# Patient Record
Sex: MALE | Race: WHITE | NOT HISPANIC OR LATINO | Employment: OTHER | URBAN - METROPOLITAN AREA
[De-identification: names, ages, dates, MRNs, and addresses within clinical notes are randomized per-mention and may not be internally consistent; named-entity substitution may affect disease eponyms.]

---

## 2017-12-19 ENCOUNTER — HOSPITAL ENCOUNTER (INPATIENT)
Facility: HOSPITAL | Age: 82
LOS: 6 days | Discharge: HOME | DRG: 175 | End: 2017-12-25
Attending: EMERGENCY MEDICINE | Admitting: INTERNAL MEDICINE
Payer: MEDICARE

## 2017-12-19 DIAGNOSIS — K56.609 SMALL BOWEL OBSTRUCTION: ICD-10-CM

## 2017-12-19 DIAGNOSIS — I26.99 PULMONARY EMBOLISM: ICD-10-CM

## 2017-12-19 DIAGNOSIS — R06.02 SHORTNESS OF BREATH: ICD-10-CM

## 2017-12-19 DIAGNOSIS — K56.7 ILEUS: ICD-10-CM

## 2017-12-19 DIAGNOSIS — I26.02 ACUTE SADDLE PULMONARY EMBOLISM WITH ACUTE COR PULMONALE: Primary | ICD-10-CM

## 2017-12-19 PROBLEM — I10 HYPERTENSION: Status: ACTIVE | Noted: 2017-12-19

## 2017-12-19 PROBLEM — N40.0 BPH (BENIGN PROSTATIC HYPERPLASIA): Status: ACTIVE | Noted: 2017-12-19

## 2017-12-19 PROBLEM — E78.5 HYPERLIPIDEMIA: Status: ACTIVE | Noted: 2017-12-19

## 2017-12-19 LAB
ABO + RH BLD: NORMAL
ALBUMIN SERPL BCP-MCNC: 3.3 G/DL
ALBUMIN SERPL BCP-MCNC: 3.4 G/DL
ALP SERPL-CCNC: 73 U/L
ALP SERPL-CCNC: 74 U/L
ALT SERPL W/O P-5'-P-CCNC: 17 U/L
ALT SERPL W/O P-5'-P-CCNC: 19 U/L
ANION GAP SERPL CALC-SCNC: 10 MMOL/L
ANION GAP SERPL CALC-SCNC: 8 MMOL/L
APTT BLDCRRT: 29.5 SEC
APTT BLDCRRT: 33.2 SEC
AST SERPL-CCNC: 18 U/L
AST SERPL-CCNC: 22 U/L
BASOPHILS # BLD AUTO: 0.06 K/UL
BASOPHILS # BLD AUTO: 0.06 K/UL
BASOPHILS # BLD AUTO: 0.07 K/UL
BASOPHILS NFR BLD: 0.8 %
BASOPHILS NFR BLD: 0.8 %
BASOPHILS NFR BLD: 1.1 %
BILIRUB SERPL-MCNC: 0.9 MG/DL
BILIRUB SERPL-MCNC: 0.9 MG/DL
BLD GP AB SCN CELLS X3 SERPL QL: NORMAL
BNP SERPL-MCNC: 313 PG/ML
BUN SERPL-MCNC: 14 MG/DL
BUN SERPL-MCNC: 14 MG/DL
CALCIUM SERPL-MCNC: 9 MG/DL
CALCIUM SERPL-MCNC: 9.2 MG/DL
CHLORIDE SERPL-SCNC: 107 MMOL/L
CHLORIDE SERPL-SCNC: 107 MMOL/L
CO2 SERPL-SCNC: 26 MMOL/L
CO2 SERPL-SCNC: 26 MMOL/L
CREAT SERPL-MCNC: 0.9 MG/DL
CREAT SERPL-MCNC: 0.9 MG/DL
DIFFERENTIAL METHOD: ABNORMAL
EOSINOPHIL # BLD AUTO: 0.2 K/UL
EOSINOPHIL # BLD AUTO: 0.2 K/UL
EOSINOPHIL # BLD AUTO: 0.3 K/UL
EOSINOPHIL NFR BLD: 2.8 %
EOSINOPHIL NFR BLD: 2.8 %
EOSINOPHIL NFR BLD: 4.1 %
ERYTHROCYTE [DISTWIDTH] IN BLOOD BY AUTOMATED COUNT: 13.4 %
ERYTHROCYTE [DISTWIDTH] IN BLOOD BY AUTOMATED COUNT: 13.5 %
ERYTHROCYTE [DISTWIDTH] IN BLOOD BY AUTOMATED COUNT: 13.5 %
EST. GFR  (AFRICAN AMERICAN): >60 ML/MIN/1.73 M^2
EST. GFR  (AFRICAN AMERICAN): >60 ML/MIN/1.73 M^2
EST. GFR  (NON AFRICAN AMERICAN): >60 ML/MIN/1.73 M^2
EST. GFR  (NON AFRICAN AMERICAN): >60 ML/MIN/1.73 M^2
FACT X PPP CHRO-ACNC: 0.85 IU/ML
FACT X PPP CHRO-ACNC: 0.91 IU/ML
GLUCOSE SERPL-MCNC: 103 MG/DL
GLUCOSE SERPL-MCNC: 113 MG/DL
HCT VFR BLD AUTO: 36.1 %
HCT VFR BLD AUTO: 36.4 %
HCT VFR BLD AUTO: 36.4 %
HGB BLD-MCNC: 12 G/DL
HGB BLD-MCNC: 12.4 G/DL
HGB BLD-MCNC: 12.4 G/DL
IMM GRANULOCYTES # BLD AUTO: 0.01 K/UL
IMM GRANULOCYTES # BLD AUTO: 0.03 K/UL
IMM GRANULOCYTES # BLD AUTO: 0.03 K/UL
IMM GRANULOCYTES NFR BLD AUTO: 0.2 %
IMM GRANULOCYTES NFR BLD AUTO: 0.4 %
IMM GRANULOCYTES NFR BLD AUTO: 0.4 %
INR PPP: 1.1
LYMPHOCYTES # BLD AUTO: 1.4 K/UL
LYMPHOCYTES # BLD AUTO: 1.4 K/UL
LYMPHOCYTES # BLD AUTO: 1.6 K/UL
LYMPHOCYTES NFR BLD: 17.4 %
LYMPHOCYTES NFR BLD: 17.4 %
LYMPHOCYTES NFR BLD: 23.7 %
MAGNESIUM SERPL-MCNC: 2.3 MG/DL
MCH RBC QN AUTO: 31.7 PG
MCH RBC QN AUTO: 32.4 PG
MCH RBC QN AUTO: 32.4 PG
MCHC RBC AUTO-ENTMCNC: 33.2 G/DL
MCHC RBC AUTO-ENTMCNC: 34.1 G/DL
MCHC RBC AUTO-ENTMCNC: 34.1 G/DL
MCV RBC AUTO: 95 FL
MONOCYTES # BLD AUTO: 0.7 K/UL
MONOCYTES # BLD AUTO: 0.8 K/UL
MONOCYTES # BLD AUTO: 0.8 K/UL
MONOCYTES NFR BLD: 11.2 %
MONOCYTES NFR BLD: 9.9 %
MONOCYTES NFR BLD: 9.9 %
NEUTROPHILS # BLD AUTO: 3.9 K/UL
NEUTROPHILS # BLD AUTO: 5.5 K/UL
NEUTROPHILS # BLD AUTO: 5.5 K/UL
NEUTROPHILS NFR BLD: 59.7 %
NEUTROPHILS NFR BLD: 68.7 %
NEUTROPHILS NFR BLD: 68.7 %
NRBC BLD-RTO: 0 /100 WBC
PHOSPHATE SERPL-MCNC: 2.5 MG/DL
PLATELET # BLD AUTO: 130 K/UL
PLATELET # BLD AUTO: 135 K/UL
PLATELET # BLD AUTO: 135 K/UL
PMV BLD AUTO: 11.3 FL
POTASSIUM SERPL-SCNC: 3.4 MMOL/L
POTASSIUM SERPL-SCNC: 3.6 MMOL/L
PROT SERPL-MCNC: 6.8 G/DL
PROT SERPL-MCNC: 7 G/DL
PROTHROMBIN TIME: 11.4 SEC
RBC # BLD AUTO: 3.79 M/UL
RBC # BLD AUTO: 3.83 M/UL
RBC # BLD AUTO: 3.83 M/UL
SODIUM SERPL-SCNC: 141 MMOL/L
SODIUM SERPL-SCNC: 143 MMOL/L
TROPONIN I SERPL DL<=0.01 NG/ML-MCNC: 0.11 NG/ML
WBC # BLD AUTO: 6.54 K/UL
WBC # BLD AUTO: 7.98 K/UL
WBC # BLD AUTO: 7.98 K/UL

## 2017-12-19 PROCEDURE — 94761 N-INVAS EAR/PLS OXIMETRY MLT: CPT

## 2017-12-19 PROCEDURE — 85730 THROMBOPLASTIN TIME PARTIAL: CPT

## 2017-12-19 PROCEDURE — 93010 ELECTROCARDIOGRAM REPORT: CPT | Mod: ,,, | Performed by: INTERNAL MEDICINE

## 2017-12-19 PROCEDURE — 83735 ASSAY OF MAGNESIUM: CPT

## 2017-12-19 PROCEDURE — 83880 ASSAY OF NATRIURETIC PEPTIDE: CPT

## 2017-12-19 PROCEDURE — 96366 THER/PROPH/DIAG IV INF ADDON: CPT

## 2017-12-19 PROCEDURE — 84100 ASSAY OF PHOSPHORUS: CPT

## 2017-12-19 PROCEDURE — 99291 CRITICAL CARE FIRST HOUR: CPT | Mod: 25

## 2017-12-19 PROCEDURE — 27000221 HC OXYGEN, UP TO 24 HOURS

## 2017-12-19 PROCEDURE — 84484 ASSAY OF TROPONIN QUANT: CPT

## 2017-12-19 PROCEDURE — 63600175 PHARM REV CODE 636 W HCPCS: Performed by: EMERGENCY MEDICINE

## 2017-12-19 PROCEDURE — 86901 BLOOD TYPING SEROLOGIC RH(D): CPT

## 2017-12-19 PROCEDURE — 99291 CRITICAL CARE FIRST HOUR: CPT | Mod: ,,, | Performed by: EMERGENCY MEDICINE

## 2017-12-19 PROCEDURE — 63600175 PHARM REV CODE 636 W HCPCS: Performed by: STUDENT IN AN ORGANIZED HEALTH CARE EDUCATION/TRAINING PROGRAM

## 2017-12-19 PROCEDURE — 93010 ELECTROCARDIOGRAM REPORT: CPT | Mod: 76,,, | Performed by: INTERNAL MEDICINE

## 2017-12-19 PROCEDURE — A4216 STERILE WATER/SALINE, 10 ML: HCPCS | Performed by: STUDENT IN AN ORGANIZED HEALTH CARE EDUCATION/TRAINING PROGRAM

## 2017-12-19 PROCEDURE — 85730 THROMBOPLASTIN TIME PARTIAL: CPT | Mod: 91

## 2017-12-19 PROCEDURE — 80053 COMPREHEN METABOLIC PANEL: CPT | Mod: 91

## 2017-12-19 PROCEDURE — 85610 PROTHROMBIN TIME: CPT

## 2017-12-19 PROCEDURE — 85520 HEPARIN ASSAY: CPT

## 2017-12-19 PROCEDURE — 85520 HEPARIN ASSAY: CPT | Mod: 91

## 2017-12-19 PROCEDURE — 85025 COMPLETE CBC W/AUTO DIFF WBC: CPT

## 2017-12-19 PROCEDURE — 93005 ELECTROCARDIOGRAM TRACING: CPT

## 2017-12-19 PROCEDURE — 80053 COMPREHEN METABOLIC PANEL: CPT

## 2017-12-19 PROCEDURE — 20000000 HC ICU ROOM

## 2017-12-19 PROCEDURE — 96365 THER/PROPH/DIAG IV INF INIT: CPT

## 2017-12-19 PROCEDURE — 25000003 PHARM REV CODE 250: Performed by: STUDENT IN AN ORGANIZED HEALTH CARE EDUCATION/TRAINING PROGRAM

## 2017-12-19 RX ORDER — TAMSULOSIN HYDROCHLORIDE 0.4 MG/1
0.4 CAPSULE ORAL DAILY
Status: DISCONTINUED | OUTPATIENT
Start: 2017-12-20 | End: 2017-12-25 | Stop reason: HOSPADM

## 2017-12-19 RX ORDER — HEPARIN SODIUM,PORCINE/D5W 25000/250
17 INTRAVENOUS SOLUTION INTRAVENOUS CONTINUOUS
Status: DISCONTINUED | OUTPATIENT
Start: 2017-12-19 | End: 2017-12-19

## 2017-12-19 RX ORDER — HYDROCHLOROTHIAZIDE 12.5 MG/1
12.5 TABLET ORAL DAILY
Status: DISCONTINUED | OUTPATIENT
Start: 2017-12-20 | End: 2017-12-19

## 2017-12-19 RX ORDER — HEPARIN SODIUM,PORCINE/D5W 25000/250
17 INTRAVENOUS SOLUTION INTRAVENOUS CONTINUOUS
Status: DISCONTINUED | OUTPATIENT
Start: 2017-12-19 | End: 2017-12-20

## 2017-12-19 RX ORDER — OXYBUTYNIN CHLORIDE 10 MG/1
10 TABLET, EXTENDED RELEASE ORAL DAILY
COMMUNITY

## 2017-12-19 RX ORDER — SIMVASTATIN 40 MG/1
40 TABLET, FILM COATED ORAL NIGHTLY
COMMUNITY

## 2017-12-19 RX ORDER — IRBESARTAN 75 MG/1
75 TABLET ORAL NIGHTLY
Status: DISCONTINUED | OUTPATIENT
Start: 2017-12-19 | End: 2017-12-19

## 2017-12-19 RX ORDER — IRBESARTAN 75 MG/1
75 TABLET ORAL NIGHTLY
COMMUNITY

## 2017-12-19 RX ORDER — SODIUM CHLORIDE 0.9 % (FLUSH) 0.9 %
3 SYRINGE (ML) INJECTION EVERY 8 HOURS
Status: DISCONTINUED | OUTPATIENT
Start: 2017-12-19 | End: 2017-12-25 | Stop reason: HOSPADM

## 2017-12-19 RX ORDER — TAMSULOSIN HYDROCHLORIDE 0.4 MG/1
0.4 CAPSULE ORAL DAILY
COMMUNITY

## 2017-12-19 RX ORDER — HYDROCHLOROTHIAZIDE 12.5 MG/1
12.5 TABLET ORAL DAILY
COMMUNITY

## 2017-12-19 RX ORDER — OXYBUTYNIN CHLORIDE 10 MG/1
10 TABLET, EXTENDED RELEASE ORAL DAILY
Status: DISCONTINUED | OUTPATIENT
Start: 2017-12-20 | End: 2017-12-25 | Stop reason: HOSPADM

## 2017-12-19 RX ORDER — SIMVASTATIN 40 MG/1
40 TABLET, FILM COATED ORAL NIGHTLY
Status: DISCONTINUED | OUTPATIENT
Start: 2017-12-19 | End: 2017-12-25 | Stop reason: HOSPADM

## 2017-12-19 RX ADMIN — HEPARIN SODIUM AND DEXTROSE 15 UNITS/KG/HR: 10000; 5 INJECTION INTRAVENOUS at 07:12

## 2017-12-19 RX ADMIN — HEPARIN SODIUM AND DEXTROSE 15 UNITS/KG/HR: 10000; 5 INJECTION INTRAVENOUS at 08:12

## 2017-12-19 RX ADMIN — Medication 3 ML: at 09:12

## 2017-12-19 RX ADMIN — SIMVASTATIN 40 MG: 40 TABLET, FILM COATED ORAL at 09:12

## 2017-12-19 RX ADMIN — HEPARIN SODIUM AND DEXTROSE 17 UNITS/KG/HR: 10000; 5 INJECTION INTRAVENOUS at 06:12

## 2017-12-19 NOTE — ED NOTES
Patient moved to ED room 4 via stretcher, patient assisted onto stretcher and changed into a gown. Patient placed on cardiac monitor, continuous pulse oximetry and automatic blood pressure cuff. Bed placed in low locked position, side rails up x 2, call light is within reach of patient or family, orientation to room and explanation of wait provided to family and patient, alarms set and turned on for monitor and pulse ox, awaiting MD evaluation and orders, will continue to monitor.

## 2017-12-19 NOTE — ED TRIAGE NOTES
Patient presents from [facility] from [place] by [facility] for bilateral PEs and bilateral DVTs. Patient went to ED with chief complaint of SOB and chest pain that started yesterday, worse with exertion. Patient was given NS and lovenox. Denies chest pain at this time, appears SOB with movement to stretcher. Patient was recently on plane

## 2017-12-19 NOTE — ED NOTES
LOC: The patient is awake, alert and aware of environment with an appropriate affect, the patient is oriented x 3 and speaking appropriately.  APPEARANCE: Patient resting comfortably and in no acute distress, patient is clean and well groomed, patient's clothing is properly fastened.  SKIN: The skin is warm and dry, patient has normal skin turgor and moist mucus membranes, skin intact, no breakdown or brusing noted.  MUSCULOSKELETAL: Patient moving all extremities well, no obvious swelling or deformities noted.  RESPIRATORY: Airway is open and patent, respirations are spontaneous, patient has a normal effort and rate. Breath sounds are clear and equal bilaterally.  CARDIAC: Normal heart sounds. No peripheral edema.  ABDOMEN: Soft and non tender to palpation, no distention noted. Bowel sounds present.

## 2017-12-19 NOTE — ED PROVIDER NOTES
Encounter Date: 12/19/2017       History     Chief Complaint   Patient presents with    Pulmonary Embolism     Transferred from [facility]. CT positive for bilateral PEs, DVTs. Reports SOB since yesterday.      HPI   Pt with PMH as detailed and reviewed below presents as a transfer from [facility] with CP and SOB, dx'd with bilateral PE's at [facility] with BLE DVT's.  Just flew into [place] from [place] yesterday.  Noted SOB before boarding the plane ride and addition of CP last night after landing.  Denies CP at this time, but does endorse SOB.  Pt doesn't take any blood thinners at this time and denies history of stroke or recent GI bleeding, head trauma, hx of DVT or PE, no active CA that he knows of.      Given Lovenox SC at [facility].      Review of patient's allergies indicates:  Allergies not on file  Past Medical History:   Diagnosis Date    Hyperlipidemia     Hypertension     Sleep apnea      Past Surgical History:   Procedure Laterality Date    CHOLECYSTECTOMY       History reviewed. No pertinent family history.  Social History   Substance Use Topics    Smoking status: Not on file    Smokeless tobacco: Not on file    Alcohol use Not on file     Review of Systems   Constitutional: Negative for chills, fatigue, fever and unexpected weight change.   HENT: Negative for facial swelling, sneezing and sore throat.    Eyes: Negative for pain and visual disturbance.   Respiratory: Positive for shortness of breath. Negative for cough and wheezing.    Cardiovascular: Positive for chest pain. Negative for leg swelling.   Gastrointestinal: Negative for abdominal pain, diarrhea and vomiting.   Endocrine: Negative for polydipsia and polyuria.   Genitourinary: Negative for decreased urine volume, difficulty urinating, dysuria and urgency.   Musculoskeletal: Negative for arthralgias and myalgias.   Skin: Negative for pallor, rash and wound.   Allergic/Immunologic: Negative for  environmental allergies and food allergies.   Neurological: Negative for dizziness, light-headedness and headaches.   Psychiatric/Behavioral: Negative for confusion. The patient is not nervous/anxious.        Physical Exam     Initial Vitals [12/19/17 1720]   BP Pulse Resp Temp SpO2   (!) 144/76 70 16 97.6 °F (36.4 °C) (!) 93 %      MAP       98.67         Physical Exam  Gen/Constitutional: Interactive. No acute distress  Head: Normocephalic, Atraumatic  Neck: supple, no masses or LAD  Eyes: PERRLA, conjunctiva clear  Ears, Nose and Throat: No rhinorrhea or stridor.  Cardiac: Reg Rhythm, No murmur  Pulmonary: CTA Bilat, no wheezes, rhonchi, rales.  GI: Abdomen soft, non-tender, non-distended; no rebound or guarding  : No CVA tenderness.  Musculoskeletal: Extremities warm, well perfused, no erythema, no edema  Skin: No rashes  Neuro: Alert and Oriented x 3; No focal motor or sensory deficits.    Psych: Normal affect    ED Course   Procedures  Labs Reviewed   CBC W/ AUTO DIFFERENTIAL - Abnormal; Notable for the following:        Result Value    RBC 3.79 (*)     Hemoglobin 12.0 (*)     Hematocrit 36.1 (*)     MCH 31.7 (*)     Platelets 130 (*)     All other components within normal limits   COMPREHENSIVE METABOLIC PANEL - Abnormal; Notable for the following:     Potassium 3.4 (*)     Albumin 3.3 (*)     All other components within normal limits   B-TYPE NATRIURETIC PEPTIDE - Abnormal; Notable for the following:      (*)     All other components within normal limits   TROPONIN I - Abnormal; Notable for the following:     Troponin I 0.114 (*)     All other components within normal limits   ANTI-XA HEPARIN MONITORING - Abnormal; Notable for the following:     Heparin Anti-Xa 0.85 (*)     All other components within normal limits   CBC W/ AUTO DIFFERENTIAL - Abnormal; Notable for the following:     RBC 3.83 (*)     Hemoglobin 12.4 (*)     Hematocrit 36.4 (*)     MCH 32.4 (*)     Platelets 135 (*)     Lymph% 17.4  (*)     All other components within normal limits   COMPREHENSIVE METABOLIC PANEL - Abnormal; Notable for the following:     Glucose 113 (*)     Albumin 3.4 (*)     All other components within normal limits   PHOSPHORUS - Abnormal; Notable for the following:     Phosphorus 2.5 (*)     All other components within normal limits   CBC W/ AUTO DIFFERENTIAL - Abnormal; Notable for the following:     RBC 3.83 (*)     Hemoglobin 12.4 (*)     Hematocrit 36.4 (*)     MCH 32.4 (*)     Platelets 135 (*)     Lymph% 17.4 (*)     All other components within normal limits   APTT   PROTIME-INR   ANTI-XA HEPARIN MONITORING   MAGNESIUM   TYPE & SCREEN     X-Ray Chest 1 View   Final Result    Mild cardiomegaly. No acute process.         Electronically signed by: PRISCA SORIANO MD   Date:     12/19/17   Time:    19:06           EKG Readings: (Independently Interpreted)   EKG: NSR, no COSTA's or STD's, TWI's in V1-V4, no STEMI            Medical Decision Making:   History:   Old Medical Records: I decided to obtain old medical records.  Initial Assessment:   Mr Hart is an 81 yo male w PMH significant for HTN, HLD and BPH who is transferred here from [facility] for catheterization-directed therapy for bilateral pulmonary embolism with concurrent BLE DVT's.   Differential Diagnosis:   Pulmonary embolism  BL DVT  PNA  CHF   Independently Interpreted Test(s):   I have ordered and independently interpreted X-rays - see prior notes.  I have ordered and independently interpreted EKG Reading(s) - see prior notes  Clinical Tests:   Lab Tests: Ordered and Reviewed       <> Summary of Lab: Anti-Xa level was elevated  First troponin was elevated  Radiological Study: Ordered and Reviewed  Medical Tests: Ordered and Reviewed  ED Management:  Dr Lomeli with cardiology was notified of pt's condition prior to arrival in the ED. Pt was immediately evaluated and vital signs reassured hemodynamic stability. Pt was immediately started  on heparin drip with PRN bolus depending on Anti-Xa level. Discussed case with cardiology who performed bedside echo and noted signs suggestive of right heart strain. Pt was immediately admitted to CCU where Cardiology will pursue catheterization in the morning.               Attending Attestation:   Physician Attestation Statement for Resident:  As the supervising MD   Physician Attestation Statement: I have personally seen and examined this patient.   I agree with the above history. -:   As the supervising MD I agree with the above PE.    As the supervising MD I agree with the above treatment, course, plan, and disposition.   -: Bilat PE's with right heart strain, Cards will admit to Cards ICU and do cath directed thrombolysis tomorrow.      Critical Care Procedure Note:  Direct patient critical care time: 15 minutes  Additional history critical care time:10 minutes  Ordering / reviewing labs and studies: critical care time:10 minutes  Documentation critical care time: 10 minutes  Consulting other physicians critical care time: 10 minutes  Total critical care time (exclusive of procedural time) : 55 minutes   Reason for Critical Care: Bilateral PE's requiring heparin gtt and with evidence of right heart strain.    I have reviewed and agree with the residents interpretation of the following: lab data, x-rays and EKG.  I have reviewed the following: records from a referring facility.                    ED Course      Clinical Impression:   Diagnoses of Shortness of breath and Pulmonary embolism were pertinent to this visit.    Disposition:   Disposition: Admitted  Condition: Stable                        Jamaal Wayne MD  12/20/17 0833

## 2017-12-20 LAB
ABO + RH BLD: NORMAL
ALBUMIN SERPL BCP-MCNC: 2.9 G/DL
ALBUMIN SERPL BCP-MCNC: 3.1 G/DL
ALBUMIN SERPL BCP-MCNC: 3.1 G/DL
ANION GAP SERPL CALC-SCNC: 5 MMOL/L
ANION GAP SERPL CALC-SCNC: 7 MMOL/L
ANION GAP SERPL CALC-SCNC: 8 MMOL/L
ANION GAP SERPL CALC-SCNC: 8 MMOL/L
APTT BLDCRRT: 46.4 SEC
APTT BLDCRRT: 49.9 SEC
APTT BLDCRRT: 57.6 SEC
BASOPHILS # BLD AUTO: 0.07 K/UL
BASOPHILS # BLD AUTO: 0.08 K/UL
BASOPHILS NFR BLD: 1 %
BASOPHILS NFR BLD: 1.2 %
BLD GP AB SCN CELLS X3 SERPL QL: NORMAL
BUN SERPL-MCNC: 14 MG/DL
CALCIUM SERPL-MCNC: 8.1 MG/DL
CALCIUM SERPL-MCNC: 8.6 MG/DL
CALCIUM SERPL-MCNC: 8.8 MG/DL
CALCIUM SERPL-MCNC: 8.8 MG/DL
CHLORIDE SERPL-SCNC: 106 MMOL/L
CHLORIDE SERPL-SCNC: 106 MMOL/L
CHLORIDE SERPL-SCNC: 107 MMOL/L
CHLORIDE SERPL-SCNC: 109 MMOL/L
CO2 SERPL-SCNC: 25 MMOL/L
CO2 SERPL-SCNC: 27 MMOL/L
CREAT SERPL-MCNC: 0.8 MG/DL
CREAT SERPL-MCNC: 0.8 MG/DL
CREAT SERPL-MCNC: 0.9 MG/DL
CREAT SERPL-MCNC: 0.9 MG/DL
DIASTOLIC DYSFUNCTION: NO
DIFFERENTIAL METHOD: ABNORMAL
DIFFERENTIAL METHOD: ABNORMAL
EOSINOPHIL # BLD AUTO: 0.3 K/UL
EOSINOPHIL # BLD AUTO: 0.4 K/UL
EOSINOPHIL NFR BLD: 5 %
EOSINOPHIL NFR BLD: 5.3 %
ERYTHROCYTE [DISTWIDTH] IN BLOOD BY AUTOMATED COUNT: 13.3 %
ERYTHROCYTE [DISTWIDTH] IN BLOOD BY AUTOMATED COUNT: 14 %
EST. GFR  (AFRICAN AMERICAN): >60 ML/MIN/1.73 M^2
EST. GFR  (NON AFRICAN AMERICAN): >60 ML/MIN/1.73 M^2
ESTIMATED PA SYSTOLIC PRESSURE: 34.36
FACT X PPP CHRO-ACNC: 0.57 IU/ML
FACT X PPP CHRO-ACNC: 0.64 IU/ML
FACT X PPP CHRO-ACNC: 0.81 IU/ML
FIBRINOGEN PPP-MCNC: 210 MG/DL
GLUCOSE SERPL-MCNC: 109 MG/DL
GLUCOSE SERPL-MCNC: 113 MG/DL
GLUCOSE SERPL-MCNC: 117 MG/DL
GLUCOSE SERPL-MCNC: 117 MG/DL
HCT VFR BLD AUTO: 33.1 %
HCT VFR BLD AUTO: 34.4 %
HGB BLD-MCNC: 11.2 G/DL
HGB BLD-MCNC: 11.7 G/DL
IMM GRANULOCYTES # BLD AUTO: 0.01 K/UL
IMM GRANULOCYTES # BLD AUTO: 0.01 K/UL
IMM GRANULOCYTES NFR BLD AUTO: 0.1 %
IMM GRANULOCYTES NFR BLD AUTO: 0.1 %
INR PPP: 1.1
INR PPP: 1.6
LYMPHOCYTES # BLD AUTO: 1.2 K/UL
LYMPHOCYTES # BLD AUTO: 1.7 K/UL
LYMPHOCYTES NFR BLD: 17.8 %
LYMPHOCYTES NFR BLD: 24.3 %
MCH RBC QN AUTO: 31.8 PG
MCH RBC QN AUTO: 32.3 PG
MCHC RBC AUTO-ENTMCNC: 33.8 G/DL
MCHC RBC AUTO-ENTMCNC: 34 G/DL
MCV RBC AUTO: 94 FL
MCV RBC AUTO: 95 FL
MONOCYTES # BLD AUTO: 0.7 K/UL
MONOCYTES # BLD AUTO: 0.7 K/UL
MONOCYTES NFR BLD: 10.7 %
MONOCYTES NFR BLD: 10.8 %
NEUTROPHILS # BLD AUTO: 4 K/UL
NEUTROPHILS # BLD AUTO: 4.4 K/UL
NEUTROPHILS NFR BLD: 58.5 %
NEUTROPHILS NFR BLD: 65.2 %
NRBC BLD-RTO: 0 /100 WBC
NRBC BLD-RTO: 0 /100 WBC
PHOSPHATE SERPL-MCNC: 2.3 MG/DL
PHOSPHATE SERPL-MCNC: 2.5 MG/DL
PHOSPHATE SERPL-MCNC: 2.5 MG/DL
PLATELET # BLD AUTO: 118 K/UL
PLATELET # BLD AUTO: 144 K/UL
PMV BLD AUTO: 10.7 FL
PMV BLD AUTO: 12.1 FL
POTASSIUM SERPL-SCNC: 3.7 MMOL/L
POTASSIUM SERPL-SCNC: 3.8 MMOL/L
PROTHROMBIN TIME: 11.3 SEC
PROTHROMBIN TIME: 16.4 SEC
RBC # BLD AUTO: 3.47 M/UL
RBC # BLD AUTO: 3.68 M/UL
RETIRED EF AND QEF - SEE NOTES: 65 (ref 55–65)
SODIUM SERPL-SCNC: 139 MMOL/L
SODIUM SERPL-SCNC: 141 MMOL/L
TRICUSPID VALVE REGURGITATION: NORMAL
WBC # BLD AUTO: 6.76 K/UL
WBC # BLD AUTO: 6.83 K/UL

## 2017-12-20 PROCEDURE — 20000000 HC ICU ROOM

## 2017-12-20 PROCEDURE — 99233 SBSQ HOSP IP/OBS HIGH 50: CPT | Mod: GC,,, | Performed by: INTERNAL MEDICINE

## 2017-12-20 PROCEDURE — 93010 ELECTROCARDIOGRAM REPORT: CPT | Mod: ,,, | Performed by: INTERNAL MEDICINE

## 2017-12-20 PROCEDURE — C1769 GUIDE WIRE: HCPCS

## 2017-12-20 PROCEDURE — 86901 BLOOD TYPING SEROLOGIC RH(D): CPT

## 2017-12-20 PROCEDURE — 85730 THROMBOPLASTIN TIME PARTIAL: CPT

## 2017-12-20 PROCEDURE — 63600175 PHARM REV CODE 636 W HCPCS

## 2017-12-20 PROCEDURE — 85384 FIBRINOGEN ACTIVITY: CPT

## 2017-12-20 PROCEDURE — 85610 PROTHROMBIN TIME: CPT

## 2017-12-20 PROCEDURE — 85730 THROMBOPLASTIN TIME PARTIAL: CPT | Mod: 91

## 2017-12-20 PROCEDURE — 37211 THROMBOLYTIC ART THERAPY: CPT | Mod: 50

## 2017-12-20 PROCEDURE — 93005 ELECTROCARDIOGRAM TRACING: CPT

## 2017-12-20 PROCEDURE — 99152 MOD SED SAME PHYS/QHP 5/>YRS: CPT

## 2017-12-20 PROCEDURE — 27000221 HC OXYGEN, UP TO 24 HOURS

## 2017-12-20 PROCEDURE — 36415 COLL VENOUS BLD VENIPUNCTURE: CPT

## 2017-12-20 PROCEDURE — 63600175 PHARM REV CODE 636 W HCPCS: Performed by: INTERNAL MEDICINE

## 2017-12-20 PROCEDURE — 3E05317 INTRODUCTION OF OTHER THROMBOLYTIC INTO PERIPHERAL ARTERY, PERCUTANEOUS APPROACH: ICD-10-PCS | Performed by: INTERNAL MEDICINE

## 2017-12-20 PROCEDURE — A4216 STERILE WATER/SALINE, 10 ML: HCPCS | Performed by: STUDENT IN AN ORGANIZED HEALTH CARE EDUCATION/TRAINING PROGRAM

## 2017-12-20 PROCEDURE — 37211 THROMBOLYTIC ART THERAPY: CPT | Mod: 50,,, | Performed by: INTERNAL MEDICINE

## 2017-12-20 PROCEDURE — 93306 TTE W/DOPPLER COMPLETE: CPT | Mod: 26,,, | Performed by: INTERNAL MEDICINE

## 2017-12-20 PROCEDURE — 25000003 PHARM REV CODE 250: Performed by: INTERNAL MEDICINE

## 2017-12-20 PROCEDURE — 25000003 PHARM REV CODE 250

## 2017-12-20 PROCEDURE — 25000003 PHARM REV CODE 250: Performed by: STUDENT IN AN ORGANIZED HEALTH CARE EDUCATION/TRAINING PROGRAM

## 2017-12-20 PROCEDURE — 94761 N-INVAS EAR/PLS OXIMETRY MLT: CPT

## 2017-12-20 PROCEDURE — 99152 MOD SED SAME PHYS/QHP 5/>YRS: CPT | Mod: ,,, | Performed by: INTERNAL MEDICINE

## 2017-12-20 PROCEDURE — 02HR33Z INSERTION OF INFUSION DEVICE INTO LEFT PULMONARY ARTERY, PERCUTANEOUS APPROACH: ICD-10-PCS | Performed by: INTERNAL MEDICINE

## 2017-12-20 PROCEDURE — B41FYZZ FLUOROSCOPY OF RIGHT LOWER EXTREMITY ARTERIES USING OTHER CONTRAST: ICD-10-PCS | Performed by: INTERNAL MEDICINE

## 2017-12-20 PROCEDURE — 85025 COMPLETE CBC W/AUTO DIFF WBC: CPT

## 2017-12-20 PROCEDURE — 85520 HEPARIN ASSAY: CPT | Mod: 91

## 2017-12-20 PROCEDURE — 85520 HEPARIN ASSAY: CPT

## 2017-12-20 PROCEDURE — 80069 RENAL FUNCTION PANEL: CPT | Mod: 91

## 2017-12-20 PROCEDURE — 93306 TTE W/DOPPLER COMPLETE: CPT

## 2017-12-20 PROCEDURE — 80069 RENAL FUNCTION PANEL: CPT

## 2017-12-20 PROCEDURE — 85610 PROTHROMBIN TIME: CPT | Mod: 91

## 2017-12-20 PROCEDURE — 02HQ33Z INSERTION OF INFUSION DEVICE INTO RIGHT PULMONARY ARTERY, PERCUTANEOUS APPROACH: ICD-10-PCS | Performed by: INTERNAL MEDICINE

## 2017-12-20 RX ORDER — SODIUM,POTASSIUM PHOSPHATES 280-250MG
2 POWDER IN PACKET (EA) ORAL
Status: DISCONTINUED | OUTPATIENT
Start: 2017-12-20 | End: 2017-12-25 | Stop reason: HOSPADM

## 2017-12-20 RX ORDER — POTASSIUM CHLORIDE 20 MEQ/15ML
60 SOLUTION ORAL
Status: DISCONTINUED | OUTPATIENT
Start: 2017-12-20 | End: 2017-12-25 | Stop reason: HOSPADM

## 2017-12-20 RX ORDER — SODIUM CHLORIDE 9 MG/ML
INJECTION, SOLUTION INTRAVENOUS CONTINUOUS
Status: DISCONTINUED | OUTPATIENT
Start: 2017-12-20 | End: 2017-12-22

## 2017-12-20 RX ORDER — DIPHENHYDRAMINE HCL 50 MG
50 CAPSULE ORAL ONCE
Status: COMPLETED | OUTPATIENT
Start: 2017-12-20 | End: 2017-12-20

## 2017-12-20 RX ORDER — POTASSIUM CHLORIDE 20 MEQ/15ML
40 SOLUTION ORAL
Status: DISCONTINUED | OUTPATIENT
Start: 2017-12-20 | End: 2017-12-25 | Stop reason: HOSPADM

## 2017-12-20 RX ORDER — ACETAMINOPHEN 325 MG/1
650 TABLET ORAL EVERY 4 HOURS PRN
Status: DISCONTINUED | OUTPATIENT
Start: 2017-12-20 | End: 2017-12-25 | Stop reason: HOSPADM

## 2017-12-20 RX ORDER — LANOLIN ALCOHOL/MO/W.PET/CERES
800 CREAM (GRAM) TOPICAL
Status: DISCONTINUED | OUTPATIENT
Start: 2017-12-20 | End: 2017-12-25 | Stop reason: HOSPADM

## 2017-12-20 RX ORDER — SODIUM CHLORIDE 9 MG/ML
3 INJECTION, SOLUTION INTRAVENOUS CONTINUOUS
Status: ACTIVE | OUTPATIENT
Start: 2017-12-20 | End: 2017-12-20

## 2017-12-20 RX ORDER — SIMETHICONE 80 MG
1 TABLET,CHEWABLE ORAL 3 TIMES DAILY PRN
Status: DISCONTINUED | OUTPATIENT
Start: 2017-12-21 | End: 2017-12-25 | Stop reason: HOSPADM

## 2017-12-20 RX ADMIN — SODIUM CHLORIDE 1000 ML: 0.9 INJECTION, SOLUTION INTRAVENOUS at 12:12

## 2017-12-20 RX ADMIN — SODIUM CHLORIDE 3 ML/KG/HR: 0.9 INJECTION, SOLUTION INTRAVENOUS at 09:12

## 2017-12-20 RX ADMIN — Medication 3 ML: at 06:12

## 2017-12-20 RX ADMIN — DIPHENHYDRAMINE HYDROCHLORIDE 50 MG: 50 CAPSULE ORAL at 09:12

## 2017-12-20 RX ADMIN — ALTEPLASE 1 MG/HR: KIT at 08:12

## 2017-12-20 RX ADMIN — POTASSIUM CHLORIDE 40 MEQ: 20 SOLUTION ORAL at 10:12

## 2017-12-20 RX ADMIN — POTASSIUM & SODIUM PHOSPHATES POWDER PACK 280-160-250 MG 2 PACKET: 280-160-250 PACK at 10:12

## 2017-12-20 RX ADMIN — Medication 3 ML: at 09:12

## 2017-12-20 RX ADMIN — SIMVASTATIN 40 MG: 40 TABLET, FILM COATED ORAL at 09:12

## 2017-12-20 NOTE — NURSING
Pt arrived to CMICU via stretcher w/RN as an escort. Pt able to move himself to ICU bed. All pt's belongings given to family members that were escorted to the waiting area. Pt in NAD at this time. CMICU will now take over care of pt.

## 2017-12-20 NOTE — NURSING
Report called from ED. Spoke to pt's current nurse Samra. All questions answered. Room still being cleaned. They will bring pt up when room is ready.

## 2017-12-20 NOTE — HPI
Mr. Hart is an 83 y/o gentleman, w/ PMHx HTN, HLD, who was admitted overnight for submassive PE. Interventional cardiology now consulted for catheter directed tPA.    Patient lives in [place], [place] and is visiting his son near [place]. He noticed he was feeling short of breath with walking while he was at the airport in [place] prior to his flight to [facility]. He arrived in [place] and was taken to an OSH ([facility] in [place], [place]). CTA at OSH showed bilateral segmental PE just at the take off from right and left main PA. Troponin was also elevated at OSH, also noted to have B/L DVT. He was transferred to [facility] overnight.     At arrival at [facility] he was requiring 3L NC to maintain O2 sat, HR in the 60's, and B/P in the 140's to 150's systolic. By this morning, he was requiring 5L NC to maintain oxygenation.    Bedside TTE in the ED showed mild-moderately depressed RV function, moderately enlarged RV, septal flattening in diastole with collapsing IVC.    Troponin elevated to 0.114. BNP at 313.  Cr 0.9, HGB 11.7.    No history of blood clots, bleeding disorders, CVA, chest pain, syncope. No recent surgeries. Normal colonoscopies in the past.    Currently breathing comfortably in the CMICU on 5L NC. On heparin gtt since arrival.

## 2017-12-20 NOTE — PROGRESS NOTES
PT transferred from Cath Lab by Dr. Lomeli and RNs x3 with portable tele and O2, EKOS x 2. This RN at bedside to accept PT.

## 2017-12-20 NOTE — ASSESSMENT & PLAN NOTE
- CTA at OSH shows bilateral segmental PE just at the take off from right and left main PA  - Bedside TTE in the ED showed mild-moderately depressed RV function, moderately enlarged RV, septal flattening in diastole with collapsing IVC  - Troponin elevated to 0.114 and BNP at 313  - Will plan for catheter directed thrombolysis  - R CFV access

## 2017-12-20 NOTE — SUBJECTIVE & OBJECTIVE
Past Medical History:   Diagnosis Date    Hyperlipidemia     Hypertension     Sleep apnea        Past Surgical History:   Procedure Laterality Date    CHOLECYSTECTOMY         Review of patient's allergies indicates:  No Known Allergies    Current Facility-Administered Medications   Medication    heparin 25,000 units in dextrose 5% 250 mL (100 units/mL) infusion    [START ON 12/20/2017] hydroCHLOROthiazide tablet 12.5 mg    irbesartan tablet 75 mg    [START ON 12/20/2017] oxybutynin 24 hr tablet 10 mg    simvastatin tablet 40 mg    sodium chloride 0.9% flush 3 mL    [START ON 12/20/2017] tamsulosin 24 hr capsule 0.4 mg     Current Outpatient Prescriptions   Medication Sig    hydroCHLOROthiazide (HYDRODIURIL) 12.5 MG Tab Take 12.5 mg by mouth once daily.    irbesartan (AVAPRO) 75 MG tablet Take 75 mg by mouth every evening.    oxybutynin (DITROPAN-XL) 10 MG 24 hr tablet Take 10 mg by mouth once daily.    simvastatin (ZOCOR) 40 MG tablet Take 40 mg by mouth every evening.    tamsulosin (FLOMAX) 0.4 mg Cp24 Take 0.4 mg by mouth once daily.     Family History     None        Social History Main Topics    Smoking status: Never Smoker    Smokeless tobacco: Not on file    Alcohol use No    Drug use: No    Sexual activity: Not on file     Review of Systems   All other systems reviewed and are negative.    Objective:     Vital Signs (Most Recent):  Temp: 97.6 °F (36.4 °C) (12/19/17 1720)  Pulse: 65 (12/19/17 1901)  Resp: 16 (12/19/17 1720)  BP: 132/64 (12/19/17 1901)  SpO2: 97 % (12/19/17 1901) Vital Signs (24h Range):  Temp:  [97.6 °F (36.4 °C)] 97.6 °F (36.4 °C)  Pulse:  [65-70] 65  Resp:  [16] 16  SpO2:  [90 %-97 %] 97 %  BP: (128-144)/(61-76) 132/64     Patient Vitals for the past 72 hrs (Last 3 readings):   Weight   12/19/17 1720 93 kg (205 lb)     Body mass index is 29.41 kg/m².    No intake or output data in the 24 hours ending 12/19/17 1943    Physical Exam   Constitutional: He is oriented to  person, place, and time. He appears well-developed.   HENT:   Head: Normocephalic and atraumatic.   Neck: Normal range of motion. Neck supple. No JVD present.   Cardiovascular: Normal rate, regular rhythm and normal heart sounds.  Exam reveals no friction rub.    No murmur heard.  Pulmonary/Chest: Effort normal and breath sounds normal. No respiratory distress.   Abdominal: Soft. Bowel sounds are normal.   Musculoskeletal: Normal range of motion. He exhibits no edema or tenderness.   Neurological: He is alert and oriented to person, place, and time.   Skin: Skin is warm and dry.       Significant Labs:  CBC:    Recent Labs  Lab 12/19/17  1743   WBC 6.54   RBC 3.79*   HGB 12.0*   HCT 36.1*   *   MCV 95   MCH 31.7*   MCHC 33.2     BNP:  No results for input(s): BNP in the last 168 hours.    Invalid input(s): BNPTRIAGELBLO  CMP:    Recent Labs  Lab 12/19/17  1743      CALCIUM 9.0   ALBUMIN 3.3*   PROT 6.8      K 3.4*   CO2 26      BUN 14   CREATININE 0.9   ALKPHOS 73   ALT 17   AST 22   BILITOT 0.9      Coagulation:     Recent Labs  Lab 12/19/17  1743   INR 1.1   APTT 29.5     LDH:  No results for input(s): LDH in the last 72 hours.  Microbiology:  Microbiology Results (last 7 days)     ** No results found for the last 168 hours. **          I have reviewed all pertinent labs within the past 24 hours.    Diagnostic Results:  I have reviewed and interpreted all pertinent imaging results/findings within the past 24 hours.

## 2017-12-20 NOTE — NURSING
To cath lab via own bed EJVON Rojo at bedside family following to wait in the cath lab waiting area.

## 2017-12-20 NOTE — HPI
Mr. Hart is an 82 y.o. man with history of HTN, HLD, who developed shortness of breath and chest discomfort prior to plane ride from [place] to [place]. The plane ride he didn't complain of shortness of breath but upon landing the shortness of breath seemed to get worse. He then went to a local ED after driving in 1 hour from airport.  No shortness of breath at rest - mostly with exertion, no palpitations, no syncope. Denies any recent surgeries, hx of cancer, family hx of blood clots, prolonged immobilization. W/u at OSH notable for bilateral segmental PE just at the take off from right and left main PA.  Troponin elevated at OSH, also noted to have DVT.     Currently HR 70, /78, satting 93% on 3L NC breathing comfortably speaking complete sentences.       Bedside TTE in the ED with mild-moderately depressed RV, moderately enlarged RV, septal flattening in diastole with collapsing IVC.

## 2017-12-20 NOTE — BRIEF OP NOTE
"    Post Cath Note  Referring Physician: Froilan Louis MD  Procedure: Pta - Pulmonary Artery (Right)         Access: Right CFV and Left CFV    No thrombus on in infra-renal IVC, left common iliac vein, or right common iliac vein    Intervention:     EKOS catheters placed in right and left pulmonary arteries    See full report for further details    Closure device: Sheaths in place Right and Left CFV    Post Cath Exam:   BP (!) 168/74   Pulse 69   Temp 97.9 °F (36.6 °C) (Oral)   Resp (!) 25   Ht 5' 10" (1.778 m)   Wt 98.8 kg (217 lb 13 oz)   SpO2 97%   BMI 31.25 kg/m²   No unusual pain, hematoma, thrill or bruit at vascular access site.  Distal pulse present without signs of ischemia.    Recommendations:   - Routine post-cath care  - tPA via right pulmonary artery EKOS catheter at 1mg/hr X 10 hrs  - tPA via left pulmonary artery EKOS catheter at 1mg/hr X 10 hrs  - Angiomax via left CFV sheath ath 0.25mg/kg/hr (heparin d/c'ed)  - Monitor for bleeding  - Check CBC and fibrinogen on arrival to ICU and in 6 hours    Signed:  Jim Rocha MD  Cardiology Fellow, PGY-5  12/20/2017 12:26 PM  "

## 2017-12-20 NOTE — PLAN OF CARE
Problem: Patient Care Overview  Goal: Plan of Care Review  Outcome: Ongoing (interventions implemented as appropriate)  Pt remained comfortable throughout the night. Pt currently on 5L NC due to lower sats, approx 89%, when he fell asleep.  While awake, pt can tolerate 3L. Heparin gtt still infusing, and currently at 13units/kg/hr with the next Xa due just before 0900 this morning. Gtt will be adjusted at that time if needed. NAD noted. VSS. Will continue to monitor.

## 2017-12-20 NOTE — CONSULTS
[facility]  Interventional Cardiology  Consult Note    Patient Name: Milo Hart  MRN: [id number]  Admission Date: 12/19/2017  Hospital Length of Stay: 1 days  Code Status: Full Code   Attending Provider: Froilan Louis MD  Consulting Provider: Jim Cavazos MD  Primary Care Physician: Primary Doctor No  Principal Problem:Pulmonary embolism    Patient information was obtained from patient, spouse/SO and ER records.     Inpatient consult to Interventional Cardiology  Consult performed by: JIM CAVAZOS  Consult ordered by: TATY AUSTIN        Subjective:     Chief Complaint:  Submassive PE     HPI:  Mr. Hart is an 81 y/o gentleman, w/ PMHx HTN, HLD, who was admitted overnight for submassive PE. Interventional cardiology now consulted for catheter directed tPA.    Patient lives in [place], [place] and is visiting his son near [place]. He noticed he was feeling short of breath with walking while he was at the airport in [place] prior to his flight to [facility]. He arrived in [place] and was taken to an OSH ([facility] in [place], [place]). CTA at OSH showed bilateral segmental PE just at the take off from right and left main PA. Troponin was also elevated at OSH, also noted to have B/L DVT. He was transferred to [facility] overnight.     At arrival at [facility] he was requiring 3L NC to maintain O2 sat, HR in the 60's, and B/P in the 140's to 150's systolic. By this morning, he was requiring 5L NC to maintain oxygenation.    Bedside TTE in the ED showed mild-moderately depressed RV function, moderately enlarged RV, septal flattening in diastole with collapsing IVC.    Troponin elevated to 0.114. BNP at 313.  Cr 0.9, HGB 11.7.    No history of blood clots, bleeding disorders, CVA, chest pain, syncope. No recent surgeries. Normal colonoscopies in the past.    Currently breathing comfortably in the CMICU on 5L NC. On heparin gtt since arrival.    Past Medical  History:   Diagnosis Date    Hyperlipidemia     Hypertension     Sleep apnea        Past Surgical History:   Procedure Laterality Date    CHOLECYSTECTOMY         Review of patient's allergies indicates:  No Known Allergies    PTA Medications   Medication Sig    hydroCHLOROthiazide (HYDRODIURIL) 12.5 MG Tab Take 12.5 mg by mouth once daily.    irbesartan (AVAPRO) 75 MG tablet Take 75 mg by mouth every evening.    oxybutynin (DITROPAN-XL) 10 MG 24 hr tablet Take 10 mg by mouth once daily.    simvastatin (ZOCOR) 40 MG tablet Take 40 mg by mouth every evening.    tamsulosin (FLOMAX) 0.4 mg Cp24 Take 0.4 mg by mouth once daily.     Family History     None        Social History Main Topics    Smoking status: Never Smoker    Smokeless tobacco: Not on file    Alcohol use No    Drug use: No    Sexual activity: Not on file     Review of Systems   Constitution: Negative for chills, fever and weight loss.   HENT: Negative for sore throat and stridor.    Eyes: Negative for blurred vision, double vision and pain.   Cardiovascular: Positive for dyspnea on exertion. Negative for chest pain, claudication, near-syncope, orthopnea, palpitations, paroxysmal nocturnal dyspnea and syncope.   Respiratory: Positive for shortness of breath. Negative for cough, sputum production and wheezing.    Endocrine: Negative for polydipsia, polyphagia and polyuria.   Skin: Negative for rash.   Musculoskeletal: Negative for joint pain, joint swelling and myalgias.   Gastrointestinal: Negative for abdominal pain, constipation, diarrhea, heartburn, melena, nausea and vomiting.   Genitourinary: Negative for dysuria and hematuria.   Neurological: Negative for focal weakness and loss of balance.   Psychiatric/Behavioral: Negative for depression and memory loss.     Objective:     Vital Signs (Most Recent):  Temp: 97.9 °F (36.6 °C) (12/20/17 0715)  Pulse: (!) 59 (12/20/17 0915)  Resp: (!) 23 (12/20/17 0915)  BP: (!) 162/71 (12/20/17  0915)  SpO2: 97 % (12/20/17 0915) Vital Signs (24h Range):  Temp:  [97.4 °F (36.3 °C)-97.9 °F (36.6 °C)] 97.9 °F (36.6 °C)  Pulse:  [59-86] 59  Resp:  [15-30] 23  SpO2:  [90 %-97 %] 97 %  BP: (128-178)/(61-81) 162/71     Weight: 98.8 kg (217 lb 13 oz)  Body mass index is 31.25 kg/m².    SpO2: 97 %  O2 Device (Oxygen Therapy): nasal cannula w/ humidification      Intake/Output Summary (Last 24 hours) at 12/20/17 1018  Last data filed at 12/20/17 0915   Gross per 24 hour   Intake            242.7 ml   Output              510 ml   Net           -267.3 ml       Lines/Drains/Airways     Peripheral Intravenous Line                 Peripheral IV - Single Lumen 12/19/17 Left Antecubital 1 day         Peripheral IV - Single Lumen 12/19/17 2016 Right Antecubital less than 1 day              Physical Exam   Constitutional: He is oriented to person, place, and time. He appears well-developed and well-nourished.   HENT:   Head: Normocephalic.   Eyes: Pupils are equal, round, and reactive to light.   Neck: Normal range of motion. No JVD present.   Cardiovascular: Normal rate and regular rhythm.    No murmur heard.  Pulmonary/Chest: Effort normal and breath sounds normal. No respiratory distress. He has no wheezes.   Few crackles at L base   Abdominal: Soft. Bowel sounds are normal. He exhibits no distension. There is no tenderness.   Musculoskeletal: Normal range of motion. He exhibits no edema.   Neurological: He is alert and oriented to person, place, and time.   Skin: Skin is warm and dry.     Significant Labs:   CMP   Recent Labs  Lab 12/19/17  1743 12/19/17  1958 12/20/17  0638    143 141  141   K 3.4* 3.6 3.7  3.7    107 106  106   CO2 26 26 27  27    113* 117*  117*   BUN 14 14 14  14   CREATININE 0.9 0.9 0.9  0.9   CALCIUM 9.0 9.2 8.8  8.8   PROT 6.8 7.0  --    ALBUMIN 3.3* 3.4* 3.1*   BILITOT 0.9 0.9  --    ALKPHOS 73 74  --    AST 22 18  --    ALT 17 19  --    ANIONGAP 8 10 8  8    ESTGFRAFRICA >60.0 >60.0 >60.0  >60.0   EGFRNONAA >60.0 >60.0 >60.0  >60.0   , CBC   Recent Labs  Lab 12/19/17 1743 12/19/17 1958 12/20/17  0450   WBC 6.54 7.98  7.98 6.83   HGB 12.0* 12.4*  12.4* 11.7*   HCT 36.1* 36.4*  36.4* 34.4*   * 135*  135* 144*   , INR   Recent Labs  Lab 12/19/17 1743 12/20/17  0638   INR 1.1 1.1    and Troponin   Recent Labs  Lab 12/19/17 1743   TROPONINI 0.114*     Assessment and Plan:     * Submassive Pulmonary embolism    - CTA at OSH shows bilateral segmental PE just at the take off from right and left main PA  - Bedside TTE in the ED showed mild-moderately depressed RV function, moderately enlarged RV, septal flattening in diastole with collapsing IVC  - Troponin elevated to 0.114 and BNP at 313  - Will plan for catheter directed thrombolysis  - R CFV access          VTE Risk Mitigation         Ordered     heparin 25,000 units in dextrose 5% 250 mL (100 units/mL) infusion  Continuous     Route:  Intravenous        12/19/17 1930     High Risk of VTE  Once      12/19/17 1930      The risks, benefits, and alternatives of catheter-directed thrombolysis were discussed with the patient. All questions were answered and informed consent was obtained. I had a detailed discussion with the patient regarding risk of stroke, MI, bleeding access site complications, renal failure, emergent need for heart surgery, acute limb complications including ischemia and loss, contrast allergy and death. Patient understands the risks and benefits of the procedure and wishes to proceed.    Patient seen and examined this morning. Thank you for the opportunity to participate in the care of this interesting patient. Discussed with Dr. Andrea Lomeli - staff attestation to follow.    Jim Rocha MD  Interventional Cardiology   [facility]

## 2017-12-20 NOTE — PROGRESS NOTES
[facility]  Cardiology  Progress Note    Patient Name: Milo Hart  MRN: [id number]  Admission Date: 12/19/2017  Hospital Length of Stay: 1 days  Code Status: Full Code   Attending Physician: Froilan Louis MD   Primary Care Physician: Primary Doctor No  Expected Discharge Date: 12/26/2017  Principal Problem:Pulmonary embolism    Subjective:     Hospital Course:   12/20: catheter directed thrombolysis to be done. Pt asx      Review of Systems   Constitution: Negative for diaphoresis and weakness.   Eyes: Negative for photophobia.   Cardiovascular: Negative for chest pain, dyspnea on exertion and irregular heartbeat.   Respiratory: Negative for cough and shortness of breath.    Gastrointestinal: Negative for abdominal pain, nausea and vomiting.     Objective:     Vital Signs (Most Recent):  Temp: 97.9 °F (36.6 °C) (12/20/17 1239)  Pulse: 72 (12/20/17 1445)  Resp: 16 (12/20/17 1445)  BP: (!) 158/72 (12/20/17 1445)  SpO2: 99 % (12/20/17 1445) Vital Signs (24h Range):  Temp:  [97.4 °F (36.3 °C)-97.9 °F (36.6 °C)] 97.9 °F (36.6 °C)  Pulse:  [50-86] 72  Resp:  [15-30] 16  SpO2:  [90 %-100 %] 99 %  BP: (128-178)/(61-81) 158/72     Weight: 102.2 kg (225 lb 5 oz)  Body mass index is 32.33 kg/m².     SpO2: 99 %  O2 Device (Oxygen Therapy): nasal cannula w/ humidification      Intake/Output Summary (Last 24 hours) at 12/20/17 1503  Last data filed at 12/20/17 1415   Gross per 24 hour   Intake          1413.72 ml   Output              760 ml   Net           653.72 ml       Lines/Drains/Airways     Central Venous Catheter Line                 Introducer 12/20/17 1231 right femoral less than 1 day         Introducer 12/20/17 1232 left femoral vein less than 1 day          Peripheral Intravenous Line                 Peripheral IV - Single Lumen 12/19/17 Left Antecubital 1 day         Peripheral IV - Single Lumen 12/19/17 2016 Right Antecubital less than 1 day                Physical Exam    Constitutional: He is oriented to person, place, and time. He appears well-developed and well-nourished. No distress.   HENT:   Head: Normocephalic and atraumatic.   Eyes: Conjunctivae are normal. Right eye exhibits no discharge. Left eye exhibits no discharge.   Neck: Normal range of motion. Neck supple.   Cardiovascular: Normal rate and regular rhythm.    Pulmonary/Chest: Effort normal and breath sounds normal.   NC in place   Abdominal: Soft. Bowel sounds are normal. There is no tenderness.   Musculoskeletal: Normal range of motion. He exhibits no edema.   Neurological: He is alert and oriented to person, place, and time.   Skin: Skin is warm and dry. He is not diaphoretic.   Psychiatric: He has a normal mood and affect. His behavior is normal.   Vitals reviewed.      Significant Labs:   CMP   Recent Labs  Lab 12/19/17  1743 12/19/17 1958 12/20/17  0638 12/20/17  0928    143 141  141 141   K 3.4* 3.6 3.7  3.7 3.7    107 106  106 107   CO2 26 26 27  27 27    113* 117*  117* 109   BUN 14 14 14  14 14   CREATININE 0.9 0.9 0.9  0.9 0.8   CALCIUM 9.0 9.2 8.8  8.8 8.6*   PROT 6.8 7.0  --   --    ALBUMIN 3.3* 3.4* 3.1* 3.1*   BILITOT 0.9 0.9  --   --    ALKPHOS 73 74  --   --    AST 22 18  --   --    ALT 17 19  --   --    ANIONGAP 8 10 8  8 7*   ESTGFRAFRICA >60.0 >60.0 >60.0  >60.0 >60.0   EGFRNONAA >60.0 >60.0 >60.0  >60.0 >60.0    and CBC   Recent Labs  Lab 12/19/17 1958 12/20/17  0450 12/20/17  1245   WBC 7.98  7.98 6.83 6.76   HGB 12.4*  12.4* 11.7* 11.2*   HCT 36.4*  36.4* 34.4* 33.1*   *  135* 144* 118*       Significant Imaging: see below    Assessment and Plan:     * Submassive Pulmonary embolism    Pt with no hx of blood clots, no recent surgeries, no hx cancer. Developed acute shortness of breath prior to plane ride but progressed after landing. CTA bilateral segmental PE just at the take off from right and left main PA.  Troponin elevated at OSH, also noted to  have DVT. Seems consistent with unprovoked PE. Bedside Echo here reveals mild to moderately depressed RV will get a formal echo tomorrow. He is hemodynamically stable at this time but will admit to the CMICU for close monitoring.   - continued on heparin gtt and until catheter directed thrombolysis today.  Will need long duration of AC given unprovoked PE but will address this after CDT.   - No thrombus on in infra-renal IVC, left common iliac vein, or right common iliac vein  - Recs: - Routine post-cath care  - tPA via right pulmonary artery EKOS catheter at 1mg/hr X 10 hrs  - tPA via left pulmonary artery EKOS catheter at 1mg/hr X 10 hrs  - Angiomax via left CFV sheath ath 0.25mg/kg/hr (heparin d/c'ed)  - Monitor for bleeding  - Check CBC and fibrinogen on arrival to ICU and in 6 hours  - Will d/c Angio max at 10pm and pull sheaths at 11 pm  - 12/20 echo:    1 - Normal left ventricular systolic function (EF 60-65%).     2 - Mild tricuspid regurgitation.     3 - Normal left ventricular diastolic function.     4 - Low normal to mildly depressed right ventricular systolic function .     5 - Increased central venous pressure.         BPH (benign prostatic hyperplasia)    Tamsulosin daily        Hyperlipidemia    Simvastatin 40 mg daily        Hypertension    Home meds: HCTZ 12.5 and irbesartan 75 mg daily  Holding            VTE Risk Mitigation         Ordered     High Risk of VTE  Once      12/19/17 1930          Urvashi Dillon MD  Cardiology  [facility]

## 2017-12-20 NOTE — HOSPITAL COURSE
Catheter directed thrombolysis done 12/20. Angio max d/c'ed and sheaths pulled later that night. Procedure tolerated well. Next day, started apixaban 10 mg BID x 7 days followed by 5 mg BID for 3 months. Stable from cardiac standpoint for d/c. Prior to d/c pt had nonbloody emesis x 1. Abd xray concerning for SBO. CT abd/pelvis ordered.

## 2017-12-20 NOTE — ED NOTES
Spoke to Jacques with cardiology. He states that it is okay to follow algorithm for heparin which was to reduce to 15mcg/kg/hr.

## 2017-12-20 NOTE — SUBJECTIVE & OBJECTIVE
Past Medical History:   Diagnosis Date    Hyperlipidemia     Hypertension     Sleep apnea        Past Surgical History:   Procedure Laterality Date    CHOLECYSTECTOMY         Review of patient's allergies indicates:  No Known Allergies    PTA Medications   Medication Sig    hydroCHLOROthiazide (HYDRODIURIL) 12.5 MG Tab Take 12.5 mg by mouth once daily.    irbesartan (AVAPRO) 75 MG tablet Take 75 mg by mouth every evening.    oxybutynin (DITROPAN-XL) 10 MG 24 hr tablet Take 10 mg by mouth once daily.    simvastatin (ZOCOR) 40 MG tablet Take 40 mg by mouth every evening.    tamsulosin (FLOMAX) 0.4 mg Cp24 Take 0.4 mg by mouth once daily.     Family History     None        Social History Main Topics    Smoking status: Never Smoker    Smokeless tobacco: Not on file    Alcohol use No    Drug use: No    Sexual activity: Not on file     Review of Systems   Constitution: Negative for chills, fever and weight loss.   HENT: Negative for sore throat and stridor.    Eyes: Negative for blurred vision, double vision and pain.   Cardiovascular: Positive for dyspnea on exertion. Negative for chest pain, claudication, near-syncope, orthopnea, palpitations, paroxysmal nocturnal dyspnea and syncope.   Respiratory: Positive for shortness of breath. Negative for cough, sputum production and wheezing.    Endocrine: Negative for polydipsia, polyphagia and polyuria.   Skin: Negative for rash.   Musculoskeletal: Negative for joint pain, joint swelling and myalgias.   Gastrointestinal: Negative for abdominal pain, constipation, diarrhea, heartburn, melena, nausea and vomiting.   Genitourinary: Negative for dysuria and hematuria.   Neurological: Negative for focal weakness and loss of balance.   Psychiatric/Behavioral: Negative for depression and memory loss.     Objective:     Vital Signs (Most Recent):  Temp: 97.9 °F (36.6 °C) (12/20/17 0715)  Pulse: (!) 59 (12/20/17 0915)  Resp: (!) 23 (12/20/17 0915)  BP: (!) 162/71  (12/20/17 0915)  SpO2: 97 % (12/20/17 0915) Vital Signs (24h Range):  Temp:  [97.4 °F (36.3 °C)-97.9 °F (36.6 °C)] 97.9 °F (36.6 °C)  Pulse:  [59-86] 59  Resp:  [15-30] 23  SpO2:  [90 %-97 %] 97 %  BP: (128-178)/(61-81) 162/71     Weight: 98.8 kg (217 lb 13 oz)  Body mass index is 31.25 kg/m².    SpO2: 97 %  O2 Device (Oxygen Therapy): nasal cannula w/ humidification      Intake/Output Summary (Last 24 hours) at 12/20/17 1018  Last data filed at 12/20/17 0915   Gross per 24 hour   Intake            242.7 ml   Output              510 ml   Net           -267.3 ml       Lines/Drains/Airways     Peripheral Intravenous Line                 Peripheral IV - Single Lumen 12/19/17 Left Antecubital 1 day         Peripheral IV - Single Lumen 12/19/17 2016 Right Antecubital less than 1 day              Physical Exam   Constitutional: He is oriented to person, place, and time. He appears well-developed and well-nourished.   HENT:   Head: Normocephalic.   Eyes: Pupils are equal, round, and reactive to light.   Neck: Normal range of motion. No JVD present.   Cardiovascular: Normal rate and regular rhythm.    No murmur heard.  Pulmonary/Chest: Effort normal and breath sounds normal. No respiratory distress. He has no wheezes.   Few crackles at L base   Abdominal: Soft. Bowel sounds are normal. He exhibits no distension. There is no tenderness.   Musculoskeletal: Normal range of motion. He exhibits no edema.   Neurological: He is alert and oriented to person, place, and time.   Skin: Skin is warm and dry.     Significant Labs:   CMP   Recent Labs  Lab 12/19/17  1743 12/19/17  1958 12/20/17  0638    143 141  141   K 3.4* 3.6 3.7  3.7    107 106  106   CO2 26 26 27  27    113* 117*  117*   BUN 14 14 14  14   CREATININE 0.9 0.9 0.9  0.9   CALCIUM 9.0 9.2 8.8  8.8   PROT 6.8 7.0  --    ALBUMIN 3.3* 3.4* 3.1*   BILITOT 0.9 0.9  --    ALKPHOS 73 74  --    AST 22 18  --    ALT 17 19  --    ANIONGAP 8 10 8  8    ESTGFRAFRICA >60.0 >60.0 >60.0  >60.0   EGFRNONAA >60.0 >60.0 >60.0  >60.0   , CBC   Recent Labs  Lab 12/19/17 1743 12/19/17  1958 12/20/17  0450   WBC 6.54 7.98  7.98 6.83   HGB 12.0* 12.4*  12.4* 11.7*   HCT 36.1* 36.4*  36.4* 34.4*   * 135*  135* 144*   , INR   Recent Labs  Lab 12/19/17 1743 12/20/17  0638   INR 1.1 1.1    and Troponin   Recent Labs  Lab 12/19/17 1743   TROPONINI 0.114*

## 2017-12-20 NOTE — HOSPITAL COURSE
Mr. Hart is an 82 y.o. man with history of HTN, HLD, who developed shortness of breath and chest discomfort prior to plane ride from [place] to [place]. The plane ride he didn't complain of shortness of breath but upon landing the shortness of breath seemed to get worse. He then went to a local ED after driving in 1 hour from airport.  No shortness of breath at rest - mostly with exertion, no palpitations, no syncope.  W/u at OSH notable for bilateral segmental PE just at the take off from right and left main PA.  Troponin elevated at OSH, also noted to have DVT.      Currently HR 70, /78, satting 93% on 3L NC breathing comfortably speaking complete sentences.       Bedside TTE in the ED with mild-moderately depressed RV, moderately enlarged RV, septal flattening in diastole with collapsing IVC.

## 2017-12-20 NOTE — ASSESSMENT & PLAN NOTE
Pt with no hx of blood clots, no recent surgeries, no hx cancer. Developed acute shortness of breath prior to plane ride but progressed after landing. CTA bilateral segmental PE just at the take off from right and left main PA.  Troponin elevated at OSH, also noted to have DVT. Seems consistent with unprovoked PE. Bedside Echo here reveals mild to moderately depressed RV will get a formal echo tomorrow. He is hemodynamically stable at this time but will admit to the CMICU for close monitoring. Will continue on heparin gtt and the plan is for catheter directed thrombolysis tomorrow.  Will need long duration of AC given unprovoked PE but will address this after CDT.

## 2017-12-20 NOTE — ASSESSMENT & PLAN NOTE
Pt with no hx of blood clots, no recent surgeries, no hx cancer. Developed acute shortness of breath prior to plane ride but progressed after landing. CTA bilateral segmental PE just at the take off from right and left main PA.  Troponin elevated at OSH, also noted to have DVT. Seems consistent with unprovoked PE. Bedside Echo here reveals mild to moderately depressed RV will get a formal echo tomorrow. He is hemodynamically stable at this time but will admit to the CMICU for close monitoring.   - continued on heparin gtt and until catheter directed thrombolysis today.  Will need long duration of AC given unprovoked PE but will address this after CDT.   - No thrombus on in infra-renal IVC, left common iliac vein, or right common iliac vein  - Recs: - Routine post-cath care  - tPA via right pulmonary artery EKOS catheter at 1mg/hr X 10 hrs  - tPA via left pulmonary artery EKOS catheter at 1mg/hr X 10 hrs  - Angiomax via left CFV sheath ath 0.25mg/kg/hr (heparin d/c'ed)  - Monitor for bleeding  - Check CBC and fibrinogen on arrival to ICU and in 6 hours  - Will d/c Angio max at 10pm and pull sheaths at 11 pm  - 12/20 echo:    1 - Normal left ventricular systolic function (EF 60-65%).     2 - Mild tricuspid regurgitation.     3 - Normal left ventricular diastolic function.     4 - Low normal to mildly depressed right ventricular systolic function .     5 - Increased central venous pressure.

## 2017-12-20 NOTE — HPI
Mr. Hart is an 82 y.o. man with history of HTN, HLD, who developed shortness of breath and chest discomfort prior to plane ride from [place] to [place]. The plane ride he didn't complain of shortness of breath but upon landing the shortness of breath seemed to get worse. He then went to a local ED after driving in 1 hour from airport.  No shortness of breath at rest - mostly with exertion, no palpitations, no syncope. Denies any recent surgeries, hx of cancer, family hx of blood clots, prolonged immobilization. W/u at OSH notable for bilateral segmental PE just at the take off from right and left main PA.  Troponin elevated at OSH, also noted to have DVT.      Currently HR 70, /78, satting 93% on 3L NC breathing comfortably speaking complete sentences.       Bedside TTE in the ED with mild-moderately depressed RV, moderately enlarged RV, septal flattening in diastole with collapsing IVC.

## 2017-12-20 NOTE — PROGRESS NOTES
Mr. Hart is an 82 y.o. man with history of HTN, HLD, who developed shortness of breath progressive after a recent plane flight from [place] to [place].  The shortness of breath progressed with some chest tightness and he went to local ED after driving in 1 hour from airport.  No shortness of breath at rest - mostly with exertion, no palpitations, no syncope.  W/u at OSH notable for bilateral segmental PE just at the take off from right and left main PA.  Troponin elevated at OSH, also noted to have DVT.     Currently HR 70, /78, satting 93% on 3L NC breathing comfortably speaking complete sentences.      Bedside TTE in the ED with mild-moderately depressed RV, moderately enlarged RV, septal flattening in diastole with collapsing IVC.    A/p:  -Admit to CCU for close observation  -Continue Heparin drip  -NPO after MN for potential catheter direct thrombolysis in the AM.  -If any decompensation overnight consider earlier intervention.    Shaun Hanna MD

## 2017-12-20 NOTE — SUBJECTIVE & OBJECTIVE
Review of Systems   Constitution: Negative for diaphoresis and weakness.   Eyes: Negative for photophobia.   Cardiovascular: Negative for chest pain, dyspnea on exertion and irregular heartbeat.   Respiratory: Negative for cough and shortness of breath.    Gastrointestinal: Negative for abdominal pain, nausea and vomiting.     Objective:     Vital Signs (Most Recent):  Temp: 97.9 °F (36.6 °C) (12/20/17 1239)  Pulse: 72 (12/20/17 1445)  Resp: 16 (12/20/17 1445)  BP: (!) 158/72 (12/20/17 1445)  SpO2: 99 % (12/20/17 1445) Vital Signs (24h Range):  Temp:  [97.4 °F (36.3 °C)-97.9 °F (36.6 °C)] 97.9 °F (36.6 °C)  Pulse:  [50-86] 72  Resp:  [15-30] 16  SpO2:  [90 %-100 %] 99 %  BP: (128-178)/(61-81) 158/72     Weight: 102.2 kg (225 lb 5 oz)  Body mass index is 32.33 kg/m².     SpO2: 99 %  O2 Device (Oxygen Therapy): nasal cannula w/ humidification      Intake/Output Summary (Last 24 hours) at 12/20/17 1503  Last data filed at 12/20/17 1415   Gross per 24 hour   Intake          1413.72 ml   Output              760 ml   Net           653.72 ml       Lines/Drains/Airways     Central Venous Catheter Line                 Introducer 12/20/17 1231 right femoral less than 1 day         Introducer 12/20/17 1232 left femoral vein less than 1 day          Peripheral Intravenous Line                 Peripheral IV - Single Lumen 12/19/17 Left Antecubital 1 day         Peripheral IV - Single Lumen 12/19/17 2016 Right Antecubital less than 1 day                Physical Exam   Constitutional: He is oriented to person, place, and time. He appears well-developed and well-nourished. No distress.   HENT:   Head: Normocephalic and atraumatic.   Eyes: Conjunctivae are normal. Right eye exhibits no discharge. Left eye exhibits no discharge.   Neck: Normal range of motion. Neck supple.   Cardiovascular: Normal rate and regular rhythm.    Pulmonary/Chest: Effort normal and breath sounds normal.   NC in place   Abdominal: Soft. Bowel sounds are  normal. There is no tenderness.   Musculoskeletal: Normal range of motion. He exhibits no edema.   Neurological: He is alert and oriented to person, place, and time.   Skin: Skin is warm and dry. He is not diaphoretic.   Psychiatric: He has a normal mood and affect. His behavior is normal.   Vitals reviewed.      Significant Labs:   CMP   Recent Labs  Lab 12/19/17  1743 12/19/17 1958 12/20/17  0638 12/20/17  0928    143 141  141 141   K 3.4* 3.6 3.7  3.7 3.7    107 106  106 107   CO2 26 26 27  27 27    113* 117*  117* 109   BUN 14 14 14  14 14   CREATININE 0.9 0.9 0.9  0.9 0.8   CALCIUM 9.0 9.2 8.8  8.8 8.6*   PROT 6.8 7.0  --   --    ALBUMIN 3.3* 3.4* 3.1* 3.1*   BILITOT 0.9 0.9  --   --    ALKPHOS 73 74  --   --    AST 22 18  --   --    ALT 17 19  --   --    ANIONGAP 8 10 8  8 7*   ESTGFRAFRICA >60.0 >60.0 >60.0  >60.0 >60.0   EGFRNONAA >60.0 >60.0 >60.0  >60.0 >60.0    and CBC   Recent Labs  Lab 12/19/17 1958 12/20/17  0450 12/20/17  1245   WBC 7.98  7.98 6.83 6.76   HGB 12.4*  12.4* 11.7* 11.2*   HCT 36.4*  36.4* 34.4* 33.1*   *  135* 144* 118*       Significant Imaging: see below

## 2017-12-20 NOTE — H&P
[facility]  Heart Transplant  H&P    Patient Name: Milo Hart  MRN: [id number]  Admission Date: 12/19/2017  Attending Physician: Jamaal Wayne MD  Primary Care Provider: Primary Doctor No  Principal Problem:Pulmonary embolism    Subjective:     History of Present Illness:  Mr. Hart is an 82 y.o. man with history of HTN, HLD, who developed shortness of breath and chest discomfort prior to plane ride from [place] to [place]. The plane ride he didn't complain of shortness of breath but upon landing the shortness of breath seemed to get worse. He then went to a local ED after driving in 1 hour from airport.  No shortness of breath at rest - mostly with exertion, no palpitations, no syncope. Denies any recent surgeries, hx of cancer, family hx of blood clots, prolonged immobilization. W/u at OSH notable for bilateral segmental PE just at the take off from right and left main PA.  Troponin elevated at OSH, also noted to have DVT.     Currently HR 70, /78, satting 93% on 3L NC breathing comfortably speaking complete sentences.       Bedside TTE in the ED with mild-moderately depressed RV, moderately enlarged RV, septal flattening in diastole with collapsing IVC.    Past Medical History:   Diagnosis Date    Hyperlipidemia     Hypertension     Sleep apnea        Past Surgical History:   Procedure Laterality Date    CHOLECYSTECTOMY         Review of patient's allergies indicates:  No Known Allergies    Current Facility-Administered Medications   Medication    heparin 25,000 units in dextrose 5% 250 mL (100 units/mL) infusion    [START ON 12/20/2017] hydroCHLOROthiazide tablet 12.5 mg    irbesartan tablet 75 mg    [START ON 12/20/2017] oxybutynin 24 hr tablet 10 mg    simvastatin tablet 40 mg    sodium chloride 0.9% flush 3 mL    [START ON 12/20/2017] tamsulosin 24 hr capsule 0.4 mg     Current Outpatient Prescriptions   Medication Sig    hydroCHLOROthiazide (HYDRODIURIL)  12.5 MG Tab Take 12.5 mg by mouth once daily.    irbesartan (AVAPRO) 75 MG tablet Take 75 mg by mouth every evening.    oxybutynin (DITROPAN-XL) 10 MG 24 hr tablet Take 10 mg by mouth once daily.    simvastatin (ZOCOR) 40 MG tablet Take 40 mg by mouth every evening.    tamsulosin (FLOMAX) 0.4 mg Cp24 Take 0.4 mg by mouth once daily.     Family History     None        Social History Main Topics    Smoking status: Never Smoker    Smokeless tobacco: Not on file    Alcohol use No    Drug use: No    Sexual activity: Not on file     Review of Systems   All other systems reviewed and are negative.    Objective:     Vital Signs (Most Recent):  Temp: 97.6 °F (36.4 °C) (12/19/17 1720)  Pulse: 65 (12/19/17 1901)  Resp: 16 (12/19/17 1720)  BP: 132/64 (12/19/17 1901)  SpO2: 97 % (12/19/17 1901) Vital Signs (24h Range):  Temp:  [97.6 °F (36.4 °C)] 97.6 °F (36.4 °C)  Pulse:  [65-70] 65  Resp:  [16] 16  SpO2:  [90 %-97 %] 97 %  BP: (128-144)/(61-76) 132/64     Patient Vitals for the past 72 hrs (Last 3 readings):   Weight   12/19/17 1720 93 kg (205 lb)     Body mass index is 29.41 kg/m².    No intake or output data in the 24 hours ending 12/19/17 1943    Physical Exam   Constitutional: He is oriented to person, place, and time. He appears well-developed.   HENT:   Head: Normocephalic and atraumatic.   Neck: Normal range of motion. Neck supple. No JVD present.   Cardiovascular: Normal rate, regular rhythm and normal heart sounds.  Exam reveals no friction rub.    No murmur heard.  Pulmonary/Chest: Effort normal and breath sounds normal. No respiratory distress.   Abdominal: Soft. Bowel sounds are normal.   Musculoskeletal: Normal range of motion. He exhibits no edema or tenderness.   Neurological: He is alert and oriented to person, place, and time.   Skin: Skin is warm and dry.       Significant Labs:  CBC:    Recent Labs  Lab 12/19/17 1743   WBC 6.54   RBC 3.79*   HGB 12.0*   HCT 36.1*   *   MCV 95   MCH 31.7*    MCHC 33.2     BNP:  No results for input(s): BNP in the last 168 hours.    Invalid input(s): BNPTRIAGELBLO  CMP:    Recent Labs  Lab 12/19/17  1743      CALCIUM 9.0   ALBUMIN 3.3*   PROT 6.8      K 3.4*   CO2 26      BUN 14   CREATININE 0.9   ALKPHOS 73   ALT 17   AST 22   BILITOT 0.9      Coagulation:     Recent Labs  Lab 12/19/17  1743   INR 1.1   APTT 29.5     LDH:  No results for input(s): LDH in the last 72 hours.  Microbiology:  Microbiology Results (last 7 days)     ** No results found for the last 168 hours. **          I have reviewed all pertinent labs within the past 24 hours.    Diagnostic Results:  I have reviewed and interpreted all pertinent imaging results/findings within the past 24 hours.    Assessment/Plan:     * Submassive Pulmonary embolism    Pt with no hx of blood clots, no recent surgeries, no hx cancer. Developed acute shortness of breath prior to plane ride but progressed after landing. CTA bilateral segmental PE just at the take off from right and left main PA.  Troponin elevated at OSH, also noted to have DVT. Seems consistent with unprovoked PE. Bedside Echo here reveals mild to moderately depressed RV will get a formal echo tomorrow. He is hemodynamically stable at this time but will admit to the CMICU for close monitoring. Will continue on heparin gtt and the plan is for catheter directed thrombolysis tomorrow.  Will need long duration of AC given unprovoked PE but will address this after CDT.         BPH (benign prostatic hyperplasia)    Will continue home medications.         Hyperlipidemia    Will continue home medications        Hypertension    Will continue blood pressure medications.             Velia Mg MD  Heart Transplant  [facility]

## 2017-12-21 LAB
ALBUMIN SERPL BCP-MCNC: 2.9 G/DL
ALBUMIN SERPL BCP-MCNC: 3 G/DL
ANION GAP SERPL CALC-SCNC: 5 MMOL/L
ANION GAP SERPL CALC-SCNC: 6 MMOL/L
ANION GAP SERPL CALC-SCNC: 6 MMOL/L
BASOPHILS # BLD AUTO: 0.06 K/UL
BASOPHILS NFR BLD: 0.7 %
BUN SERPL-MCNC: 12 MG/DL
BUN SERPL-MCNC: 13 MG/DL
BUN SERPL-MCNC: 13 MG/DL
CALCIUM SERPL-MCNC: 8.4 MG/DL
CALCIUM SERPL-MCNC: 8.5 MG/DL
CALCIUM SERPL-MCNC: 8.8 MG/DL
CHLORIDE SERPL-SCNC: 110 MMOL/L
CO2 SERPL-SCNC: 24 MMOL/L
CO2 SERPL-SCNC: 24 MMOL/L
CO2 SERPL-SCNC: 25 MMOL/L
CREAT SERPL-MCNC: 0.9 MG/DL
DIASTOLIC DYSFUNCTION: NO
DIFFERENTIAL METHOD: ABNORMAL
EOSINOPHIL # BLD AUTO: 0.3 K/UL
EOSINOPHIL NFR BLD: 3.1 %
ERYTHROCYTE [DISTWIDTH] IN BLOOD BY AUTOMATED COUNT: 13.3 %
EST. GFR  (AFRICAN AMERICAN): >60 ML/MIN/1.73 M^2
EST. GFR  (NON AFRICAN AMERICAN): >60 ML/MIN/1.73 M^2
ESTIMATED PA SYSTOLIC PRESSURE: 28.61
GLUCOSE SERPL-MCNC: 118 MG/DL
GLUCOSE SERPL-MCNC: 127 MG/DL
GLUCOSE SERPL-MCNC: 127 MG/DL
HCT VFR BLD AUTO: 34.2 %
HGB BLD-MCNC: 11.4 G/DL
IMM GRANULOCYTES # BLD AUTO: 0.04 K/UL
IMM GRANULOCYTES NFR BLD AUTO: 0.4 %
INR PPP: 1
LYMPHOCYTES # BLD AUTO: 1.3 K/UL
LYMPHOCYTES NFR BLD: 14.3 %
MCH RBC QN AUTO: 31.7 PG
MCHC RBC AUTO-ENTMCNC: 33.3 G/DL
MCV RBC AUTO: 95 FL
MITRAL VALVE MOBILITY: NORMAL
MITRAL VALVE REGURGITATION: NORMAL
MONOCYTES # BLD AUTO: 1.1 K/UL
MONOCYTES NFR BLD: 11.9 %
NEUTROPHILS # BLD AUTO: 6.2 K/UL
NEUTROPHILS NFR BLD: 69.6 %
NRBC BLD-RTO: 0 /100 WBC
PHOSPHATE SERPL-MCNC: 2.7 MG/DL
PHOSPHATE SERPL-MCNC: 2.9 MG/DL
PLATELET # BLD AUTO: 122 K/UL
PMV BLD AUTO: 11.1 FL
POTASSIUM SERPL-SCNC: 3.9 MMOL/L
POTASSIUM SERPL-SCNC: 4 MMOL/L
POTASSIUM SERPL-SCNC: 4.1 MMOL/L
PROTHROMBIN TIME: 11.1 SEC
RBC # BLD AUTO: 3.6 M/UL
RETIRED EF AND QEF - SEE NOTES: 60 (ref 55–65)
SODIUM SERPL-SCNC: 140 MMOL/L
TRICUSPID VALVE REGURGITATION: NORMAL
WBC # BLD AUTO: 8.91 K/UL

## 2017-12-21 PROCEDURE — 25000003 PHARM REV CODE 250: Performed by: HOSPITALIST

## 2017-12-21 PROCEDURE — 99233 SBSQ HOSP IP/OBS HIGH 50: CPT | Mod: GC,,, | Performed by: INTERNAL MEDICINE

## 2017-12-21 PROCEDURE — 27000221 HC OXYGEN, UP TO 24 HOURS

## 2017-12-21 PROCEDURE — 25000003 PHARM REV CODE 250: Performed by: STUDENT IN AN ORGANIZED HEALTH CARE EDUCATION/TRAINING PROGRAM

## 2017-12-21 PROCEDURE — C1894 INTRO/SHEATH, NON-LASER: HCPCS

## 2017-12-21 PROCEDURE — A4216 STERILE WATER/SALINE, 10 ML: HCPCS | Performed by: STUDENT IN AN ORGANIZED HEALTH CARE EDUCATION/TRAINING PROGRAM

## 2017-12-21 PROCEDURE — 80048 BASIC METABOLIC PNL TOTAL CA: CPT

## 2017-12-21 PROCEDURE — 85025 COMPLETE CBC W/AUTO DIFF WBC: CPT

## 2017-12-21 PROCEDURE — 85610 PROTHROMBIN TIME: CPT

## 2017-12-21 PROCEDURE — 93306 TTE W/DOPPLER COMPLETE: CPT

## 2017-12-21 PROCEDURE — 20000000 HC ICU ROOM

## 2017-12-21 PROCEDURE — 93306 TTE W/DOPPLER COMPLETE: CPT | Mod: 26,,, | Performed by: INTERNAL MEDICINE

## 2017-12-21 PROCEDURE — 80069 RENAL FUNCTION PANEL: CPT | Mod: 91

## 2017-12-21 RX ORDER — IRBESARTAN 75 MG/1
75 TABLET ORAL DAILY
Status: DISCONTINUED | OUTPATIENT
Start: 2017-12-21 | End: 2017-12-25 | Stop reason: HOSPADM

## 2017-12-21 RX ADMIN — APIXABAN 10 MG: 5 TABLET, FILM COATED ORAL at 08:12

## 2017-12-21 RX ADMIN — IRBESARTAN 75 MG: 75 TABLET ORAL at 09:12

## 2017-12-21 RX ADMIN — APIXABAN 5 MG: 5 TABLET, FILM COATED ORAL at 09:12

## 2017-12-21 RX ADMIN — TRAZODONE HYDROCHLORIDE 25 MG: 50 TABLET ORAL at 12:12

## 2017-12-21 RX ADMIN — APIXABAN 5 MG: 5 TABLET, FILM COATED ORAL at 11:12

## 2017-12-21 RX ADMIN — SIMVASTATIN 40 MG: 40 TABLET, FILM COATED ORAL at 08:12

## 2017-12-21 RX ADMIN — POTASSIUM & SODIUM PHOSPHATES POWDER PACK 280-160-250 MG 2 PACKET: 280-160-250 PACK at 02:12

## 2017-12-21 RX ADMIN — Medication 3 ML: at 06:12

## 2017-12-21 RX ADMIN — SIMETHICONE CHEW TAB 80 MG 80 MG: 80 TABLET ORAL at 12:12

## 2017-12-21 RX ADMIN — Medication 3 ML: at 02:12

## 2017-12-21 RX ADMIN — OXYBUTYNIN CHLORIDE 10 MG: 10 TABLET, FILM COATED, EXTENDED RELEASE ORAL at 09:12

## 2017-12-21 RX ADMIN — TAMSULOSIN HYDROCHLORIDE 0.4 MG: 0.4 CAPSULE ORAL at 09:12

## 2017-12-21 RX ADMIN — Medication 3 ML: at 10:12

## 2017-12-21 NOTE — PLAN OF CARE
Problem: Patient Care Overview  Goal: Plan of Care Review  Outcome: Ongoing (interventions implemented as appropriate)  No gtts. No hematoma, bleeding, or redness at bilateral groin sites. No acute events throughout day, VS and assessment per flow sheet, patient progressing towards goals as tolerated. Plan is to stepdown later today. Plan of care reviewed with Milo Hart and family, all concerns addressed, will continue to monitor.      Problem: Fall Risk (Adult)  Intervention: Patient Rounds  Past Medical History:   Diagnosis Date    Hyperlipidemia     Hypertension     Sleep apnea

## 2017-12-21 NOTE — PROGRESS NOTES
[facility]  Cardiology  Progress Note    Patient Name: Milo Hart  MRN: [id number]  Admission Date: 12/19/2017  Hospital Length of Stay: 2 days  Code Status: Full Code   Attending Physician: Froilan Louis MD   Primary Care Physician: Primary Doctor No  Expected Discharge Date: 12/26/2017  Principal Problem:Pulmonary embolism    Subjective:     Hospital Course:   12/20: catheter directed thrombolysis done. Angio max d/c'ed at 10 pm and sheaths pulled after.  12/21: Started apixaban, monitor today. Likely stepdown sesar.      Review of Systems   Constitution: Negative for diaphoresis and weakness.   Eyes: Negative for photophobia.   Cardiovascular: Negative for chest pain, dyspnea on exertion and irregular heartbeat.   Respiratory: Negative for cough and shortness of breath.    Gastrointestinal: Negative for abdominal pain, nausea and vomiting.     Objective:     Vital Signs (Most Recent):  Temp: 98 °F (36.7 °C) (12/21/17 1100)  Pulse: 62 (12/21/17 1400)  Resp: 19 (12/21/17 1400)  BP: (!) 152/65 (12/21/17 1400)  SpO2: 96 % (12/21/17 1400) Vital Signs (24h Range):  Temp:  [97.8 °F (36.6 °C)-98.3 °F (36.8 °C)] 98 °F (36.7 °C)  Pulse:  [50-77] 62  Resp:  [14-21] 19  SpO2:  [94 %-100 %] 96 %  BP: (120-173)/(57-78) 152/65     Weight: 102.4 kg (225 lb 12 oz)  Body mass index is 32.39 kg/m².     SpO2: 96 %  O2 Device (Oxygen Therapy): room air      Intake/Output Summary (Last 24 hours) at 12/21/17 1431  Last data filed at 12/21/17 0701   Gross per 24 hour   Intake             1466 ml   Output             1025 ml   Net              441 ml       Lines/Drains/Airways     Peripheral Intravenous Line                 Peripheral IV - Single Lumen 12/19/17 Left Antecubital 2 days         Peripheral IV - Single Lumen 12/19/17 2016 Right Antecubital 1 day                Physical Exam   Constitutional: He is oriented to person, place, and time. He appears well-developed and well-nourished. No distress.    HENT:   Head: Normocephalic and atraumatic.   Eyes: Conjunctivae are normal. Right eye exhibits no discharge. Left eye exhibits no discharge.   Neck: Normal range of motion. Neck supple.   Cardiovascular: Normal rate and regular rhythm.    Pulmonary/Chest: Effort normal and breath sounds normal.   NC in place   Abdominal: Soft. Bowel sounds are normal. There is no tenderness.   Musculoskeletal: Normal range of motion. He exhibits no edema.   Neurological: He is alert and oriented to person, place, and time.   Skin: Skin is warm and dry. He is not diaphoretic.   Psychiatric: He has a normal mood and affect. His behavior is normal.   Vitals reviewed.      Significant Labs:   CMP   Recent Labs  Lab 12/19/17  1743 12/19/17  1958  12/20/17  0928 12/20/17  2055 12/21/17  0640 12/21/17  0815    143  < > 141 139 140 140   K 3.4* 3.6  < > 3.7 3.8 3.9 4.0    107  < > 107 109 110 110   CO2 26 26  < > 27 25 24 25    113*  < > 109 113* 127* 127*   BUN 14 14  < > 14 14 13 12   CREATININE 0.9 0.9  < > 0.8 0.8 0.9 0.9   CALCIUM 9.0 9.2  < > 8.6* 8.1* 8.4* 8.5*   PROT 6.8 7.0  --   --   --   --   --    ALBUMIN 3.3* 3.4*  < > 3.1* 2.9*  --  2.9*   BILITOT 0.9 0.9  --   --   --   --   --    ALKPHOS 73 74  --   --   --   --   --    AST 22 18  --   --   --   --   --    ALT 17 19  --   --   --   --   --    ANIONGAP 8 10  < > 7* 5* 6* 5*   ESTGFRAFRICA >60.0 >60.0  < > >60.0 >60.0 >60.0 >60.0   EGFRNONAA >60.0 >60.0  < > >60.0 >60.0 >60.0 >60.0   < > = values in this interval not displayed. and CBC     Recent Labs  Lab 12/20/17  0450 12/20/17  1245 12/21/17  0815   WBC 6.83 6.76 8.91   HGB 11.7* 11.2* 11.4*   HCT 34.4* 33.1* 34.2*   * 118* 122*       Significant Imaging: see below    Assessment and Plan:     * Submassive Pulmonary embolism    Pt with no hx of blood clots, no recent surgeries, no hx cancer. CTA bilateral segmental PE just at the take off from right and left main PA.  Troponin elevated at OSH,  also noted to have DVT. Seems consistent with unprovoked PE. Bedside Echo here reveals mild to moderately depressed RV will get a formal echo tomorrow. He is hemodynamically stable at this time but admitted to the CCU for close monitoring.   - continued on heparin gtt and until catheter directed thrombolysis 12/20.  Will need long duration of AC given unprovoked PE.  - No thrombus on in infra-renal IVC, left common iliac vein, or right common iliac vein  - d/c'ed Angio max at 10pm and pulled sheaths at 11 pm  - 12/20 echo:    1 - Normal left ventricular systolic function (EF 60-65%).     2 - Mild tricuspid regurgitation.     3 - Normal left ventricular diastolic function.     4 - Low normal to mildly depressed right ventricular systolic function .     5 - Increased central venous pressure.   - Started on apixaban 10 mg BID x 7 days, followed by 5 mg BID        BPH (benign prostatic hyperplasia)    Tamsulosin daily        Hyperlipidemia    Simvastatin 40 mg daily        Hypertension    Home meds: HCTZ 12.5 and irbesartan 75 mg daily  Holding          Dispo: monitor on apixaban today, likely s/d tomorrow    VTE Risk Mitigation         Ordered     apixaban tablet 5 mg  2 times daily     Route:  Oral        12/21/17 1048     apixaban tablet 10 mg  2 times daily     Route:  Oral        12/21/17 1048     High Risk of VTE  Once      12/19/17 1930          Urvashi Dillon MD  Cardiology  [facility]

## 2017-12-21 NOTE — PLAN OF CARE
12/21/17 1213   Discharge Assessment   Assessment Type Discharge Planning Assessment   Confirmed/corrected address and phone number on facesheet? Yes   Assessment information obtained from? Medical Record   Expected Length of Stay (days) 3   Communicated expected length of stay with patient/caregiver yes   Prior to hospitilization cognitive status: Alert/Oriented   Prior to hospitalization functional status: Independent   Current cognitive status: Alert/Oriented   Current Functional Status: Independent   Facility Arrived From: Via ED   Lives With spouse   Able to Return to Prior Arrangements yes   Is patient able to care for self after discharge? Yes   Who are your caregiver(s) and their phone number(s)? spouse Emmy Hart [phone]   Patient's perception of discharge disposition admitted as an inpatient   Readmission Within The Last 30 Days no previous admission in last 30 days   Patient currently being followed by outpatient case management? No   Patient currently receives any other outside agency services? No   Equipment Currently Used at Home none   Do you have any problems affording any of your prescribed medications? No   Is the patient taking medications as prescribed? yes   Does the patient have transportation home? Yes   Transportation Available car;family or friend will provide   Does the patient receive services at the Coumadin Clinic? No   Discharge Plan A Home with family   Discharge Plan B Home with family   Patient/Family In Agreement With Plan yes     Patient is from [place], [place] and is in [facility]. After the plane ride and a 1 hour car ride the patient developed worsening SOB. He came to the ED to be evaluated and was diagnosed with bilateral PE. Direct catheter TpA administered overnight. Catheters removed today. The patient will require anticoagulation for discharge. TBD DOAC vs coumadin. He has transportation and good family support. No other needs assessed at this time.

## 2017-12-21 NOTE — SUBJECTIVE & OBJECTIVE
Review of Systems   Constitution: Negative for diaphoresis and weakness.   Eyes: Negative for photophobia.   Cardiovascular: Negative for chest pain, dyspnea on exertion and irregular heartbeat.   Respiratory: Negative for cough and shortness of breath.    Gastrointestinal: Negative for abdominal pain, nausea and vomiting.     Objective:     Vital Signs (Most Recent):  Temp: 98 °F (36.7 °C) (12/21/17 1100)  Pulse: 62 (12/21/17 1400)  Resp: 19 (12/21/17 1400)  BP: (!) 152/65 (12/21/17 1400)  SpO2: 96 % (12/21/17 1400) Vital Signs (24h Range):  Temp:  [97.8 °F (36.6 °C)-98.3 °F (36.8 °C)] 98 °F (36.7 °C)  Pulse:  [50-77] 62  Resp:  [14-21] 19  SpO2:  [94 %-100 %] 96 %  BP: (120-173)/(57-78) 152/65     Weight: 102.4 kg (225 lb 12 oz)  Body mass index is 32.39 kg/m².     SpO2: 96 %  O2 Device (Oxygen Therapy): room air      Intake/Output Summary (Last 24 hours) at 12/21/17 1431  Last data filed at 12/21/17 0701   Gross per 24 hour   Intake             1466 ml   Output             1025 ml   Net              441 ml       Lines/Drains/Airways     Peripheral Intravenous Line                 Peripheral IV - Single Lumen 12/19/17 Left Antecubital 2 days         Peripheral IV - Single Lumen 12/19/17 2016 Right Antecubital 1 day                Physical Exam   Constitutional: He is oriented to person, place, and time. He appears well-developed and well-nourished. No distress.   HENT:   Head: Normocephalic and atraumatic.   Eyes: Conjunctivae are normal. Right eye exhibits no discharge. Left eye exhibits no discharge.   Neck: Normal range of motion. Neck supple.   Cardiovascular: Normal rate and regular rhythm.    Pulmonary/Chest: Effort normal and breath sounds normal.   NC in place   Abdominal: Soft. Bowel sounds are normal. There is no tenderness.   Musculoskeletal: Normal range of motion. He exhibits no edema.   Neurological: He is alert and oriented to person, place, and time.   Skin: Skin is warm and dry. He is not  diaphoretic.   Psychiatric: He has a normal mood and affect. His behavior is normal.   Vitals reviewed.      Significant Labs:   CMP   Recent Labs  Lab 12/19/17  1743 12/19/17  1958  12/20/17  0928 12/20/17  2055 12/21/17  0640 12/21/17  0815    143  < > 141 139 140 140   K 3.4* 3.6  < > 3.7 3.8 3.9 4.0    107  < > 107 109 110 110   CO2 26 26  < > 27 25 24 25    113*  < > 109 113* 127* 127*   BUN 14 14  < > 14 14 13 12   CREATININE 0.9 0.9  < > 0.8 0.8 0.9 0.9   CALCIUM 9.0 9.2  < > 8.6* 8.1* 8.4* 8.5*   PROT 6.8 7.0  --   --   --   --   --    ALBUMIN 3.3* 3.4*  < > 3.1* 2.9*  --  2.9*   BILITOT 0.9 0.9  --   --   --   --   --    ALKPHOS 73 74  --   --   --   --   --    AST 22 18  --   --   --   --   --    ALT 17 19  --   --   --   --   --    ANIONGAP 8 10  < > 7* 5* 6* 5*   ESTGFRAFRICA >60.0 >60.0  < > >60.0 >60.0 >60.0 >60.0   EGFRNONAA >60.0 >60.0  < > >60.0 >60.0 >60.0 >60.0   < > = values in this interval not displayed. and CBC     Recent Labs  Lab 12/20/17  0450 12/20/17  1245 12/21/17  0815   WBC 6.83 6.76 8.91   HGB 11.7* 11.2* 11.4*   HCT 34.4* 33.1* 34.2*   * 118* 122*       Significant Imaging: see below

## 2017-12-21 NOTE — NURSING
Dr Valiente removed bilat fem sheaths. No bleeding noted. Dressings placed per MD. Good DP pulses noted. Pt to remain on bedrest at this time per MD orders. NAD noted. VSS. Will continue to monitor.

## 2017-12-21 NOTE — NURSING
MD at bedside preparing to remove bilat fem sheaths in place. All supplies requested supplied. NAD noted with pt. VSS. Will continue to monitor.

## 2017-12-21 NOTE — ASSESSMENT & PLAN NOTE
Pt with no hx of blood clots, no recent surgeries, no hx cancer. CTA bilateral segmental PE just at the take off from right and left main PA.  Troponin elevated at OSH, also noted to have DVT. Seems consistent with unprovoked PE. Bedside Echo here reveals mild to moderately depressed RV will get a formal echo tomorrow. He is hemodynamically stable at this time but admitted to the CCU for close monitoring.   - continued on heparin gtt and until catheter directed thrombolysis 12/20.  Will need long duration of AC given unprovoked PE.  - No thrombus on in infra-renal IVC, left common iliac vein, or right common iliac vein  - d/c'ed Angio max at 10pm and pulled sheaths at 11 pm  - 12/20 echo:    1 - Normal left ventricular systolic function (EF 60-65%).     2 - Mild tricuspid regurgitation.     3 - Normal left ventricular diastolic function.     4 - Low normal to mildly depressed right ventricular systolic function .     5 - Increased central venous pressure.   - Started on apixaban 10 mg BID x 7 days, followed by 5 mg BID

## 2017-12-21 NOTE — PROGRESS NOTES
Pulled sheaths at bedside after catheters were removed. Held pressure each time for 15 minutes. No noted bleeding or oozing from sites after holding pressure. Distal pulses intact. Patient tolerated removal well. Told patient to remain in bed in a supine position.     Anabell Valiente MD, PGY-4

## 2017-12-21 NOTE — PLAN OF CARE
Problem: Patient Care Overview  Goal: Plan of Care Review  Outcome: Ongoing (interventions implemented as appropriate)  Pt's IV meds d/liz at 2200 last night. Bilat fem caths were pulled at that time by Dr. Lomeli and Rocco. At 2300 Dr. Valiente returned to pull bilat fem sheaths that remained. Everything pulled w/no blood noted. Dressings placed in bilat groin per MD. Bilat fem sites remained soft w/no hematomas noted throughout the night. DP Pulses remained +2. Pt tolerated well throughout the night after sheaths were pulled. NAD noted at this time. VSS. Will continue to monitor.

## 2017-12-21 NOTE — NURSING
In report, day shift RN, Clementine, spoke with pharmacy regarding pt's alteplase. Medication only stable for 8 hours. She previously spoke to another pharmacist that stated the medication would be hand delivered and on floor at due time. Medication is about to be due, and has not arrived. Pharmacist states that she will mix the medication and have it delivered as soon as possible. NAD noted w/pt. VSS. Will continue to monitor and wait for medication from pharmacy.

## 2017-12-22 PROBLEM — K56.609 SMALL BOWEL OBSTRUCTION: Status: ACTIVE | Noted: 2017-12-22

## 2017-12-22 PROBLEM — R06.02 SHORTNESS OF BREATH: Status: RESOLVED | Noted: 2017-12-19 | Resolved: 2017-12-22

## 2017-12-22 PROBLEM — R14.0 ABDOMINAL DISTENSION: Status: ACTIVE | Noted: 2017-12-22

## 2017-12-22 LAB
ANION GAP SERPL CALC-SCNC: 9 MMOL/L
BASOPHILS # BLD AUTO: 0.06 K/UL
BASOPHILS NFR BLD: 0.6 %
BUN SERPL-MCNC: 12 MG/DL
CALCIUM SERPL-MCNC: 8.5 MG/DL
CHLORIDE SERPL-SCNC: 109 MMOL/L
CO2 SERPL-SCNC: 21 MMOL/L
CREAT SERPL-MCNC: 0.8 MG/DL
DIFFERENTIAL METHOD: ABNORMAL
EOSINOPHIL # BLD AUTO: 0.2 K/UL
EOSINOPHIL NFR BLD: 2.1 %
ERYTHROCYTE [DISTWIDTH] IN BLOOD BY AUTOMATED COUNT: 13.2 %
EST. GFR  (AFRICAN AMERICAN): >60 ML/MIN/1.73 M^2
EST. GFR  (NON AFRICAN AMERICAN): >60 ML/MIN/1.73 M^2
GLUCOSE SERPL-MCNC: 116 MG/DL
HCT VFR BLD AUTO: 34 %
HGB BLD-MCNC: 11.5 G/DL
IMM GRANULOCYTES # BLD AUTO: 0.04 K/UL
IMM GRANULOCYTES NFR BLD AUTO: 0.4 %
INR PPP: 1.2
LYMPHOCYTES # BLD AUTO: 1.1 K/UL
LYMPHOCYTES NFR BLD: 10.3 %
MCH RBC QN AUTO: 32.3 PG
MCHC RBC AUTO-ENTMCNC: 33.8 G/DL
MCV RBC AUTO: 96 FL
MONOCYTES # BLD AUTO: 1.1 K/UL
MONOCYTES NFR BLD: 10.4 %
NEUTROPHILS # BLD AUTO: 7.8 K/UL
NEUTROPHILS NFR BLD: 76.2 %
NRBC BLD-RTO: 0 /100 WBC
PLATELET # BLD AUTO: 134 K/UL
PMV BLD AUTO: 11 FL
POTASSIUM SERPL-SCNC: 4.1 MMOL/L
PROTHROMBIN TIME: 12.2 SEC
RBC # BLD AUTO: 3.56 M/UL
SODIUM SERPL-SCNC: 139 MMOL/L
WBC # BLD AUTO: 10.2 K/UL

## 2017-12-22 PROCEDURE — 25500020 PHARM REV CODE 255: Performed by: INTERNAL MEDICINE

## 2017-12-22 PROCEDURE — 85025 COMPLETE CBC W/AUTO DIFF WBC: CPT

## 2017-12-22 PROCEDURE — 25000003 PHARM REV CODE 250: Performed by: INTERNAL MEDICINE

## 2017-12-22 PROCEDURE — A4216 STERILE WATER/SALINE, 10 ML: HCPCS | Performed by: STUDENT IN AN ORGANIZED HEALTH CARE EDUCATION/TRAINING PROGRAM

## 2017-12-22 PROCEDURE — 63600175 PHARM REV CODE 636 W HCPCS

## 2017-12-22 PROCEDURE — 80048 BASIC METABOLIC PNL TOTAL CA: CPT

## 2017-12-22 PROCEDURE — 99233 SBSQ HOSP IP/OBS HIGH 50: CPT | Mod: GC,,, | Performed by: INTERNAL MEDICINE

## 2017-12-22 PROCEDURE — 97161 PT EVAL LOW COMPLEX 20 MIN: CPT

## 2017-12-22 PROCEDURE — 85610 PROTHROMBIN TIME: CPT

## 2017-12-22 PROCEDURE — 25000003 PHARM REV CODE 250: Performed by: HOSPITALIST

## 2017-12-22 PROCEDURE — 99223 1ST HOSP IP/OBS HIGH 75: CPT | Mod: GC,,, | Performed by: SURGERY

## 2017-12-22 PROCEDURE — 20000000 HC ICU ROOM

## 2017-12-22 PROCEDURE — 25000003 PHARM REV CODE 250: Performed by: STUDENT IN AN ORGANIZED HEALTH CARE EDUCATION/TRAINING PROGRAM

## 2017-12-22 RX ORDER — ONDANSETRON 2 MG/ML
4 INJECTION INTRAMUSCULAR; INTRAVENOUS ONCE
Status: COMPLETED | OUTPATIENT
Start: 2017-12-22 | End: 2017-12-22

## 2017-12-22 RX ORDER — ONDANSETRON 2 MG/ML
INJECTION INTRAMUSCULAR; INTRAVENOUS
Status: COMPLETED
Start: 2017-12-22 | End: 2017-12-22

## 2017-12-22 RX ORDER — HYDROCHLOROTHIAZIDE 12.5 MG/1
12.5 TABLET ORAL DAILY
Status: DISCONTINUED | OUTPATIENT
Start: 2017-12-22 | End: 2017-12-23

## 2017-12-22 RX ORDER — HYDROCHLOROTHIAZIDE 12.5 MG/1
12.5 TABLET ORAL DAILY
Status: DISCONTINUED | OUTPATIENT
Start: 2017-12-22 | End: 2017-12-22

## 2017-12-22 RX ADMIN — IRBESARTAN 75 MG: 75 TABLET ORAL at 10:12

## 2017-12-22 RX ADMIN — HYDROCHLOROTHIAZIDE 12.5 MG: 12.5 TABLET ORAL at 01:12

## 2017-12-22 RX ADMIN — APIXABAN 10 MG: 5 TABLET, FILM COATED ORAL at 09:12

## 2017-12-22 RX ADMIN — SIMVASTATIN 40 MG: 40 TABLET, FILM COATED ORAL at 09:12

## 2017-12-22 RX ADMIN — TAMSULOSIN HYDROCHLORIDE 0.4 MG: 0.4 CAPSULE ORAL at 10:12

## 2017-12-22 RX ADMIN — ONDANSETRON 4 MG: 2 INJECTION INTRAMUSCULAR; INTRAVENOUS at 04:12

## 2017-12-22 RX ADMIN — Medication 3 ML: at 05:12

## 2017-12-22 RX ADMIN — ACETAMINOPHEN 650 MG: 325 TABLET ORAL at 06:12

## 2017-12-22 RX ADMIN — IOHEXOL 75 ML: 350 INJECTION, SOLUTION INTRAVENOUS at 10:12

## 2017-12-22 RX ADMIN — APIXABAN 10 MG: 5 TABLET, FILM COATED ORAL at 10:12

## 2017-12-22 RX ADMIN — Medication 3 ML: at 10:12

## 2017-12-22 RX ADMIN — OXYBUTYNIN CHLORIDE 10 MG: 10 TABLET, FILM COATED, EXTENDED RELEASE ORAL at 10:12

## 2017-12-22 NOTE — DISCHARGE INSTRUCTIONS
Please take apixaban 10mg by mouth twice a day for 6 more days. (each tab=5mg)    Then take apixaban 5 mg by mouth twice a day for 3 months.

## 2017-12-22 NOTE — PT/OT/SLP EVAL
Physical Therapy Evaluation    Patient Name:  Milo Hart   MRN:  [id number]    Recommendations:     Discharge Recommendations:  home   Discharge Equipment Recommendations: none   Barriers to discharge: 2 flights of stairs with L HR (pt reported no issues ascending/descending stairs PTA)    Assessment:     Milo Hart is a 82 y.o. male admitted with a medical diagnosis of Pulmonary embolism.  He presents with the following impairments/functional limitations:  impaired endurance, gait instability. Pt indep with transfers and requires supervision for ambulation at this time.     Rehab Prognosis:  good; patient would benefit from acute skilled PT services to address these deficits and reach maximum level of function.      Recent Surgery: Procedure(s) (LRB):  Pta - Pulmonary Artery (Right)      Plan:     During this hospitalization, patient to be seen 4 x/week to address the above listed problems via gait training, therapeutic activities, therapeutic exercises, neuromuscular re-education  · Plan of Care Expires:  01/21/18   Plan of Care Reviewed with: patient    Subjective     Communicated with RN prior to session.  Patient found in chair upon PT entry to room, agreeable to evaluation.      Chief Complaint: none reported   Patient comments/goals: to return home   Pain/Comfort:  · Pain Rating 1: 2/10 (arthritis in R hand)  · Pain Addressed 1: Distraction  · Pain Rating Post-Intervention 1: 2/10    Patients cultural, spiritual, Worship conflicts given the current situation: none reported    Living Environment:  Pt lives with wife in 2 story home with a basement. The house has 2 full flights of COSTA with L HR.  Prior to admission, patients level of function was indep with ambulation and ADL's.  Patient has the following equipment: none.  DME owned (not currently used): none.  Upon discharge, patient will have assistance from wife.    Objective:     Patient found with: telemetry, pulse ox (continuous), blood  pressure cuff     General Precautions: Standard, fall   Orthopedic Precautions:N/A   Braces: N/A     Exams:  · RLE ROM: WFL  · RLE Strength: WFL  · LLE ROM: WFL  · LLE Strength: WFL    Functional Mobility:  · Bed Mobility: NT 2nd to pt found in chair   · Transfers:     · Sit to Stand:  independence with no AD  · Gait: ~200 ft with supervision; increased TUCKER and decreased homero; no LOB; no SOB  · Balance:   · Static sit: indep  · Dynamic sit: indep  · Static stand: indep  · Dynamic stand: supervision     AM-PAC 6 CLICK MOBILITY  Total Score:22       Therapeutic Activities and Exercises:  Educated pt on PT role/POC  Educated pt on importance of OOB activity  Pt verbalized understanding    Patient left up in chair with all lines intact, call button in reach and RN present.    GOALS:    Physical Therapy Goals        Problem: Physical Therapy Goal    Goal Priority Disciplines Outcome Goal Variances Interventions   Physical Therapy Goal     PT/OT, PT Ongoing (interventions implemented as appropriate)     Description:  Goals to be met by: 2017    Patient will increase functional independence with mobility by performin. Supine to sit with [place] - not met  2. Sit to stand transfer with [place] - not met  3. Gait  x 300 feet with [place] - not met                      History:     Past Medical History:   Diagnosis Date    Hyperlipidemia     Hypertension     Sleep apnea        Past Surgical History:   Procedure Laterality Date    CHOLECYSTECTOMY           Time Tracking:     PT Received On: 17  PT Start Time: 1338     PT Stop Time: 1350  PT Total Time (min): 12 min     Billable Minutes: Evaluation 12      Mónica Teran PT, DPT  2017  [phone]

## 2017-12-22 NOTE — PLAN OF CARE
Problem: Physical Therapy Goal  Goal: Physical Therapy Goal  Goals to be met by: 2017    Patient will increase functional independence with mobility by performin. Supine to sit with [place] - not met  2. Sit to stand transfer with [place] - not met  3. Gait  x 300 feet with [place] - not met    Outcome: Ongoing (interventions implemented as appropriate)  eval completed and goals appropriate.

## 2017-12-22 NOTE — PLAN OF CARE
See vital signs and assessments in flowsheets. See below for updates on today's progress.     Pulmonary: Room Air to Nasal Cannula 2LPM overnight. O2 sats %, no SOB or dyspnea reported    Cardiovascular: Sinus Rhythm 60s-70s, no PVCs, no ectopies, good equal bilateral pulses, SBP 130s-160s, MAP>65, no chest pain reported.     Neurological: AAOx4, Afebrile    Gastrointestinal: on cardiac diet, good appetite, no BM this shift    Genitourinary: Ambulatory, uses the toilet and urinal, good UOP    Integumentary/Other: Bilateral grooin sites are clean, dry, intact    Infusions: N/A    Patient progressing towards goals as tolerated, plan of care communicated and reviewed with Milo Hart and family. All concerns addressed. Will continue to monitor.

## 2017-12-22 NOTE — NURSING
Eliquist prescription has been corrected and pt's family has ontained the correct number of pills from  our outpt pharmacy.    Pt became N/v x1 and MD notified - Zofran given.  MD cam e to bedside to assess pt (pt and family and this RN noted that pt's abd is more distended than earlier today.  ABD xray will be ordered.      Pt and family agree that all must be done before he leaves.  They have a 1 hour car ride home to daughter's in [facility].      Pt's wife, Emmy, has been instructed to contact medical records for this hopitalization records to be sent to their MD back in [facility].      JENELLE Lee made aware of delay in D/c to home as outlined above.

## 2017-12-23 PROBLEM — K56.7 ILEUS: Status: ACTIVE | Noted: 2017-12-23

## 2017-12-23 LAB
ALBUMIN SERPL BCP-MCNC: 2.8 G/DL
ANION GAP SERPL CALC-SCNC: 10 MMOL/L
ANION GAP SERPL CALC-SCNC: 10 MMOL/L
BASOPHILS # BLD AUTO: 0.05 K/UL
BASOPHILS NFR BLD: 0.4 %
BUN SERPL-MCNC: 16 MG/DL
BUN SERPL-MCNC: 16 MG/DL
CALCIUM SERPL-MCNC: 8.6 MG/DL
CALCIUM SERPL-MCNC: 8.6 MG/DL
CHLORIDE SERPL-SCNC: 105 MMOL/L
CHLORIDE SERPL-SCNC: 105 MMOL/L
CO2 SERPL-SCNC: 22 MMOL/L
CO2 SERPL-SCNC: 22 MMOL/L
CREAT SERPL-MCNC: 0.8 MG/DL
CREAT SERPL-MCNC: 0.8 MG/DL
DIFFERENTIAL METHOD: ABNORMAL
EOSINOPHIL # BLD AUTO: 0 K/UL
EOSINOPHIL NFR BLD: 0.2 %
ERYTHROCYTE [DISTWIDTH] IN BLOOD BY AUTOMATED COUNT: 12.9 %
EST. GFR  (AFRICAN AMERICAN): >60 ML/MIN/1.73 M^2
EST. GFR  (AFRICAN AMERICAN): >60 ML/MIN/1.73 M^2
EST. GFR  (NON AFRICAN AMERICAN): >60 ML/MIN/1.73 M^2
EST. GFR  (NON AFRICAN AMERICAN): >60 ML/MIN/1.73 M^2
FACT X PPP CHRO-ACNC: 1.48 IU/ML
FACT X PPP CHRO-ACNC: 1.59 IU/ML
FACT X PPP CHRO-ACNC: 1.64 IU/ML
FACT X PPP CHRO-ACNC: 1.64 IU/ML
GLUCOSE SERPL-MCNC: 114 MG/DL
GLUCOSE SERPL-MCNC: 114 MG/DL
HCT VFR BLD AUTO: 35.4 %
HGB BLD-MCNC: 12 G/DL
IMM GRANULOCYTES # BLD AUTO: 0.06 K/UL
IMM GRANULOCYTES NFR BLD AUTO: 0.5 %
INR PPP: 1.2
LYMPHOCYTES # BLD AUTO: 1 K/UL
LYMPHOCYTES NFR BLD: 8.4 %
MCH RBC QN AUTO: 32 PG
MCHC RBC AUTO-ENTMCNC: 33.9 G/DL
MCV RBC AUTO: 94 FL
MONOCYTES # BLD AUTO: 1.4 K/UL
MONOCYTES NFR BLD: 11.2 %
NEUTROPHILS # BLD AUTO: 9.8 K/UL
NEUTROPHILS NFR BLD: 79.3 %
NRBC BLD-RTO: 0 /100 WBC
PHOSPHATE SERPL-MCNC: 3.7 MG/DL
PLATELET # BLD AUTO: 143 K/UL
PMV BLD AUTO: 10.6 FL
POTASSIUM SERPL-SCNC: 4 MMOL/L
POTASSIUM SERPL-SCNC: 4 MMOL/L
PROTHROMBIN TIME: 12.4 SEC
RBC # BLD AUTO: 3.75 M/UL
SODIUM SERPL-SCNC: 137 MMOL/L
SODIUM SERPL-SCNC: 137 MMOL/L
WBC # BLD AUTO: 12.32 K/UL

## 2017-12-23 PROCEDURE — A4216 STERILE WATER/SALINE, 10 ML: HCPCS | Performed by: STUDENT IN AN ORGANIZED HEALTH CARE EDUCATION/TRAINING PROGRAM

## 2017-12-23 PROCEDURE — 94761 N-INVAS EAR/PLS OXIMETRY MLT: CPT

## 2017-12-23 PROCEDURE — 25000003 PHARM REV CODE 250: Performed by: STUDENT IN AN ORGANIZED HEALTH CARE EDUCATION/TRAINING PROGRAM

## 2017-12-23 PROCEDURE — 85610 PROTHROMBIN TIME: CPT

## 2017-12-23 PROCEDURE — 85520 HEPARIN ASSAY: CPT

## 2017-12-23 PROCEDURE — 20600001 HC STEP DOWN PRIVATE ROOM

## 2017-12-23 PROCEDURE — 85025 COMPLETE CBC W/AUTO DIFF WBC: CPT

## 2017-12-23 PROCEDURE — 25000003 PHARM REV CODE 250: Performed by: HOSPITALIST

## 2017-12-23 PROCEDURE — 36415 COLL VENOUS BLD VENIPUNCTURE: CPT

## 2017-12-23 PROCEDURE — 63600175 PHARM REV CODE 636 W HCPCS: Performed by: HOSPITALIST

## 2017-12-23 PROCEDURE — 99232 SBSQ HOSP IP/OBS MODERATE 35: CPT | Mod: GC,,, | Performed by: INTERNAL MEDICINE

## 2017-12-23 PROCEDURE — 25500020 PHARM REV CODE 255: Performed by: SURGERY

## 2017-12-23 PROCEDURE — 80069 RENAL FUNCTION PANEL: CPT

## 2017-12-23 PROCEDURE — 85520 HEPARIN ASSAY: CPT | Mod: 91

## 2017-12-23 RX ORDER — HEPARIN SODIUM,PORCINE/D5W 25000/250
17 INTRAVENOUS SOLUTION INTRAVENOUS CONTINUOUS
Status: DISCONTINUED | OUTPATIENT
Start: 2017-12-23 | End: 2017-12-25 | Stop reason: HOSPADM

## 2017-12-23 RX ORDER — DEXTROSE MONOHYDRATE AND SODIUM CHLORIDE 5; .9 G/100ML; G/100ML
INJECTION, SOLUTION INTRAVENOUS CONTINUOUS
Status: DISCONTINUED | OUTPATIENT
Start: 2017-12-23 | End: 2017-12-24

## 2017-12-23 RX ADMIN — Medication 3 ML: at 10:12

## 2017-12-23 RX ADMIN — Medication 3 ML: at 01:12

## 2017-12-23 RX ADMIN — DEXTROSE AND SODIUM CHLORIDE: 5; .9 INJECTION, SOLUTION INTRAVENOUS at 10:12

## 2017-12-23 RX ADMIN — IRBESARTAN 75 MG: 75 TABLET ORAL at 05:12

## 2017-12-23 RX ADMIN — TAMSULOSIN HYDROCHLORIDE 0.4 MG: 0.4 CAPSULE ORAL at 05:12

## 2017-12-23 RX ADMIN — Medication 3 ML: at 06:12

## 2017-12-23 RX ADMIN — IOHEXOL 100 ML: 350 INJECTION, SOLUTION INTRAVENOUS at 09:12

## 2017-12-23 RX ADMIN — OXYBUTYNIN CHLORIDE 10 MG: 10 TABLET, FILM COATED, EXTENDED RELEASE ORAL at 05:12

## 2017-12-23 RX ADMIN — HEPARIN SODIUM AND DEXTROSE 17 UNITS/KG/HR: 10000; 5 INJECTION INTRAVENOUS at 09:12

## 2017-12-23 RX ADMIN — DEXTROSE AND SODIUM CHLORIDE: 5; .9 INJECTION, SOLUTION INTRAVENOUS at 01:12

## 2017-12-23 RX ADMIN — SIMVASTATIN 40 MG: 40 TABLET, FILM COATED ORAL at 09:12

## 2017-12-23 NOTE — PROGRESS NOTES
Evaluated patient again @ 0130. He is resting comfortably.  States he feels better and less distended.  No nausea or vomiting.  No epistaxis from NG tube placement.  Output 1.5L already of opaque, yellow fluid.  CT scan obtained and reviewed by me. There is no free air or pneumatosis but there is pretty diffuse dilatation of the small bowel with no obvious transition point or obstructing lesion.  SMA is patent.  Bowel wall is not expressly thickened or inflamed appearing.      Vitals:    12/23/17 0100   BP: (!) 151/67   Pulse: 80   Resp: 18   Temp:      On exam, patient is less distended and softer.  Still without tenderness.  Bowel sounds remain hypoactive.

## 2017-12-23 NOTE — PROGRESS NOTES
[facility]  Cardiology  Progress Note    Patient Name: Milo Hart  MRN: [id number]  Admission Date: 12/19/2017  Hospital Length of Stay: 4 days  Code Status: Full Code   Attending Physician: Jackie Johns MD   Primary Care Physician: Primary Doctor No  Expected Discharge Date: 12/22/2017  Principal Problem:Small bowel obstruction    Subjective:     Hospital Course:   Catheter directed thrombolysis done 12/20. Angio max d/c'ed and sheaths pulled later that night. Procedure tolerated well. Next day, started apixaban 10 mg BID x 7 days followed by 5 mg BID for 3 months. Stable from cardiac standpoint for d/c. Prior to d/c pt had nonbloody emesis x 1. Abd xray concerning for SBO. CT abd/pelvis ordered.    Interval History:   Abdominal distension, KUB concerning for ileus vs SBO  Surgery consulted and following  CTA abd with no obstruction or bowel ischemia  NPO NGT    Review of Systems   Constitution: Negative for diaphoresis and weakness.   Cardiovascular: Negative for chest pain, dyspnea on exertion and irregular heartbeat.   Respiratory: Negative for cough, shortness of breath and wheezing.    Gastrointestinal: Positive for vomiting (non-bloody x 1). Negative for abdominal pain, constipation, diarrhea, melena and nausea.     Objective:     Vital Signs (Most Recent):  Temp: 97.8 °F (36.6 °C) (12/23/17 1459)  Pulse: (!) 57 (12/23/17 1500)  Resp: 18 (12/23/17 1459)  BP: 131/64 (12/23/17 1459)  SpO2: 96 % (12/23/17 1459) Vital Signs (24h Range):  Temp:  [97.8 °F (36.6 °C)-98.4 °F (36.9 °C)] 97.8 °F (36.6 °C)  Pulse:  [57-86] 57  Resp:  [17-32] 18  SpO2:  [90 %-100 %] 96 %  BP: (129-168)/(60-74) 131/64     Weight: 102.7 kg (226 lb 7.7 oz)  Body mass index is 32.5 kg/m².     SpO2: 96 %  O2 Device (Oxygen Therapy): room air      Intake/Output Summary (Last 24 hours) at 12/23/17 2189  Last data filed at 12/23/17 1000   Gross per 24 hour   Intake           314.16 ml   Output             2500 ml   Net          -2185.84 ml       Lines/Drains/Airways     Drain                 NG/OG Tube 12/22/17 2030 18 Fr. Left nostril less than 1 day          Peripheral Intravenous Line                 Peripheral IV - Single Lumen 12/19/17 Left Antecubital 4 days         Peripheral IV - Single Lumen 12/19/17 2016 Right Antecubital 3 days                Physical Exam   Constitutional: He is oriented to person, place, and time. He appears well-developed and well-nourished. No distress.   HENT:   Head: Normocephalic and atraumatic.   Eyes: Conjunctivae are normal. Right eye exhibits no discharge. Left eye exhibits no discharge.   Neck: Normal range of motion. Neck supple.   Cardiovascular: Normal rate and regular rhythm.    Pulmonary/Chest: Effort normal and breath sounds normal.   Abdominal: Soft. Bowel sounds are normal. He exhibits distension. There is no tenderness. There is no rebound and no guarding.   Musculoskeletal: Normal range of motion. He exhibits no edema.   Neurological: He is alert and oriented to person, place, and time.   Skin: Skin is warm and dry. He is not diaphoretic.   Psychiatric: He has a normal mood and affect. His behavior is normal.   Vitals reviewed.        Assessment and Plan:     Brief HPI: Step down to medicine  NPO  NTG to suction  Surgery following  Heparin IV or Lovenox while NPO    * Small bowel obstruction    - distention w/non-bloody emesis x 1, denies nausea. No tenderness to palpation, BS x 4, passing gas and having BMs  - abd xray- concerning for SBO  - CT abd/pelvis ordered  Surgery following  NPO NGT to suction        BPH (benign prostatic hyperplasia)    Tamsulosin daily        Hyperlipidemia    Simvastatin 40 mg daily        Hypertension    Home meds: HCTZ 12.5 and irbesartan 75 mg daily  Restarted         Submassive Pulmonary embolism    - catheter directed thrombolysis 12/20  - No thrombus on in infra-renal IVC, left common iliac vein, or right common iliac vein  - 12/20 echo:    1 -  Normal left ventricular systolic function (EF 60-65%).     2 - Mild tricuspid regurgitation.     3 - Normal left ventricular diastolic function.     4 - Low normal to mildly depressed right ventricular systolic function .     5 - Increased central venous pressure.   - currently NPO  Started heparin IV            VTE Risk Mitigation         Ordered     heparin 25,000 units in dextrose 5% 250 mL (100 units/mL) infusion  Continuous     Route:  Intravenous        12/23/17 0728     apixaban tablet 10 mg  2 times daily     Route:  Oral        12/21/17 1048     High Risk of VTE  Once      12/19/17 1930          Shira Westfall MD  Cardiology  [facility]

## 2017-12-23 NOTE — NURSING
Results for CLARISSA THOMAS (MRN [id number]) as of 12/23/2017 16:26   Ref. Range 12/22/2017 03:09 12/22/2017 17:40 12/22/2017 22:15 12/22/2017 23:49 12/23/2017 03:14 12/23/2017 03:14 12/23/2017 08:34 12/23/2017 13:51   Heparin Anti-Xa Latest Ref Range: 0.30 - 0.70 IU/mL       1.64 (H) 1.64 (H)   Tressa Johns M.D.  Held x 1 hour.  Ordered to hold x 1 hour and get Anti-Xa at 1800 hours.

## 2017-12-23 NOTE — ASSESSMENT & PLAN NOTE
- catheter directed thrombolysis 12/20  - No thrombus on in infra-renal IVC, left common iliac vein, or right common iliac vein  - 12/20 echo:    1 - Normal left ventricular systolic function (EF 60-65%).     2 - Mild tricuspid regurgitation.     3 - Normal left ventricular diastolic function.     4 - Low normal to mildly depressed right ventricular systolic function .     5 - Increased central venous pressure.   - currently NPO  Started heparin IV

## 2017-12-23 NOTE — NURSING
Administered 3 morning medications and clamped off tubing from suction.  Educated patient to call if he becomes nauseas.  Omnipaque solution will be given at 1900 hours.  Chest X-Ray Abd AP view ordered.  Patient/family informed.

## 2017-12-23 NOTE — ASSESSMENT & PLAN NOTE
- distention w/non-bloody emesis x 1, denies nausea. No tenderness to palpation, BS x 4, passing gas and having BMs  - abd xray- concerning for SBO  - CT abd/pelvis ordered

## 2017-12-23 NOTE — SUBJECTIVE & OBJECTIVE
No current facility-administered medications on file prior to encounter.      No current outpatient prescriptions on file prior to encounter.       Review of patient's allergies indicates:  No Known Allergies    Past Medical History:   Diagnosis Date    Hyperlipidemia     Hypertension     Sleep apnea      Past Surgical History:   Procedure Laterality Date    CHOLECYSTECTOMY       Family History     None        Social History Main Topics    Smoking status: Never Smoker    Smokeless tobacco: Not on file    Alcohol use No    Drug use: No    Sexual activity: Not on file     Review of Systems   Constitutional: Positive for appetite change. Negative for activity change, chills, fever and unexpected weight change.   HENT: Negative.    Eyes: Negative.    Respiratory: Negative for cough and shortness of breath.    Cardiovascular: Negative for chest pain and palpitations.   Gastrointestinal: Positive for abdominal distention, nausea and vomiting. Negative for abdominal pain, blood in stool, constipation and diarrhea.   Genitourinary: Positive for difficulty urinating (known prostatic hypertrophy). Negative for dysuria and hematuria.   Musculoskeletal: Negative.    Skin: Negative.    Neurological: Negative.    Hematological: Bruises/bleeds easily (on oral anticoagulation).     Objective:     Vital Signs (Most Recent):  Temp: 98.1 °F (36.7 °C) (12/22/17 1839)  Pulse: 76 (12/22/17 1700)  Resp: (!) 25 (12/22/17 1700)  BP: (!) 145/67 (12/22/17 1700)  SpO2: (!) 94 % (12/22/17 1700) Vital Signs (24h Range):  Temp:  [98 °F (36.7 °C)-98.4 °F (36.9 °C)] 98.1 °F (36.7 °C)  Pulse:  [65-83] 76  Resp:  [18-31] 25  SpO2:  [89 %-98 %] 94 %  BP: (137-171)/(63-74) 145/67     Weight: 102.4 kg (225 lb 12 oz)  Body mass index is 32.39 kg/m².    Physical Exam   Constitutional: He is oriented to person, place, and time. He appears well-developed and well-nourished. No distress.   HENT:   Head: Normocephalic and atraumatic.   Eyes:  Conjunctivae and EOM are normal. No scleral icterus.   Neck: Normal range of motion.   Cardiovascular: Normal rate and regular rhythm.    Pulmonary/Chest: Effort normal and breath sounds normal. No respiratory distress.   Abdominal: Soft. He exhibits distension (tympanic on percussion). He exhibits no mass. Bowel sounds are decreased. There is no tenderness. There is no guarding. No hernia.   Musculoskeletal: Normal range of motion. He exhibits no edema.   Neurological: He is alert and oriented to person, place, and time.   Skin: Skin is warm and dry. Capillary refill takes less than 2 seconds. He is not diaphoretic.   Psychiatric: He has a normal mood and affect.   Nursing note and vitals reviewed.      Significant Labs:  CBC:   Recent Labs  Lab 12/22/17  0309   WBC 10.20   RBC 3.56*   HGB 11.5*   HCT 34.0*   *   MCV 96   MCH 32.3*   MCHC 33.8     CMP:   Recent Labs  Lab 12/19/17 1958 12/21/17 2000 12/22/17  0309   *  < > 118* 116*   CALCIUM 9.2  < > 8.8 8.5*   ALBUMIN 3.4*  < > 3.0*  --    PROT 7.0  --   --   --      < > 140 139   K 3.6  < > 4.1 4.1   CO2 26  < > 24 21*     < > 110 109   BUN 14  < > 13 12   CREATININE 0.9  < > 0.9 0.8   ALKPHOS 74  --   --   --    ALT 19  --   --   --    AST 18  --   --   --    BILITOT 0.9  --   --   --    < > = values in this interval not displayed.    Significant Diagnostics:  Abdominal x-ray: large gastric bubble; obstructive bowel gas pattern; some gas and stool visible in colon

## 2017-12-23 NOTE — PROGRESS NOTES
Cardiology notified of Anti Xa result 1.64. Ordered to hold heparin gtt for 3 hours draw an anti Xa and resume at lower dose per nomogram 14U/kg and will continue to follow nomogram.

## 2017-12-23 NOTE — HPI
"Milo Hart is a 82 y.o. male with h/o HTN and HLD, who developed acute onset shortness of breath and chest discomfort prior to plane ride from [place] to [place]; after landing, his symptoms acutely worsened.  He presented to an OSH where he was diagnosed with bilateral segmental PEs.  He was transferred to [facility] for management.  He was started on heparin gtt and underwent a catheter directed thrombolysis.  He had progressed well over the past couple of days and was nearing discharge on Eliquis when he developed nausea and vomiting today.  He and his wife state that he is "a little robust" around the waist at baseline, but they both noticed some abdominal distention yesterday afternoon.  He was passing flatus and having BMs and was tolerating diet well so they thought nothing of it.  Today, he had little appetite for breakfast or lunch.  He went on a walk this afternoon and developed nausea and bilious emesis.  An abdominal x-ray was obtained showing an obstructive bowel gas pattern. He reports decreased flatus today and he only had a very small BM.  He denies fever, chills, abdominal pain, abdominal or groin bulge, dysuria, hematuria, decreased urine volume, weight loss, chest pain, shortness of breath, blood in stool.  Last colonoscopy was 4 years ago and reportedly was "normal"; he was told to have another in 5 years.  No family history of colon cancer.  His abdominal surgical history consists of a lap alhaji in 2000, which was reportedly uncomplicated.  We have no medical records here as he is from [place].  "

## 2017-12-23 NOTE — NURSING
Received report from Maia, [facility], [phone].  Patient via stretcher.  Placed on cardiac monitoring.  Called telemetry to add to continuous monitoring system.  NG/OG tube left nostril to intermittent suction.  Right/left groin sites CDI, bruising noted on left groin.  Heparin on HOLD x 3 hours for Anti-Xa of 1.64.  Will restart at 1313 hours.  5% Dextrose and 0.9% NaCl+ infusing.  There is no order in MAR.  Requested Maia have the order placed.  Tressa Johns M.D. to notify of patient's arrival.  Vitals and admit assessment completed.  Patient and spouse oriented to room.  Will continue to monitor.

## 2017-12-23 NOTE — SUBJECTIVE & OBJECTIVE
Review of Systems   Constitution: Negative for diaphoresis and weakness.   Cardiovascular: Negative for chest pain, dyspnea on exertion and irregular heartbeat.   Respiratory: Negative for cough, shortness of breath and wheezing.    Gastrointestinal: Positive for vomiting (non-bloody x 1). Negative for abdominal pain, constipation, diarrhea, melena and nausea.     Objective:     Vital Signs (Most Recent):  Temp: 98.1 °F (36.7 °C) (12/22/17 1839)  Pulse: 76 (12/22/17 1700)  Resp: (!) 25 (12/22/17 1700)  BP: (!) 145/67 (12/22/17 1700)  SpO2: (!) 94 % (12/22/17 1700) Vital Signs (24h Range):  Temp:  [98 °F (36.7 °C)-98.4 °F (36.9 °C)] 98.1 °F (36.7 °C)  Pulse:  [65-83] 76  Resp:  [18-31] 25  SpO2:  [89 %-98 %] 94 %  BP: (137-171)/(63-98) 145/67     Weight: 102.4 kg (225 lb 12 oz)  Body mass index is 32.39 kg/m².     SpO2: (!) 94 %  O2 Device (Oxygen Therapy): room air      Intake/Output Summary (Last 24 hours) at 12/22/17 1958  Last data filed at 12/22/17 1600   Gross per 24 hour   Intake              840 ml   Output             2290 ml   Net            -1450 ml       Lines/Drains/Airways     Peripheral Intravenous Line                 Peripheral IV - Single Lumen 12/19/17 Left Antecubital 3 days         Peripheral IV - Single Lumen 12/19/17 2016 Right Antecubital 2 days                Physical Exam   Constitutional: He is oriented to person, place, and time. He appears well-developed and well-nourished. No distress.   HENT:   Head: Normocephalic and atraumatic.   Eyes: Conjunctivae are normal. Right eye exhibits no discharge. Left eye exhibits no discharge.   Neck: Normal range of motion. Neck supple.   Cardiovascular: Normal rate and regular rhythm.    Pulmonary/Chest: Effort normal and breath sounds normal.   Abdominal: Soft. Bowel sounds are normal. He exhibits distension. There is no tenderness. There is no rebound and no guarding.   Musculoskeletal: Normal range of motion. He exhibits no edema.   Neurological:  He is alert and oriented to person, place, and time.   Skin: Skin is warm and dry. He is not diaphoretic.   Psychiatric: He has a normal mood and affect. His behavior is normal.   Vitals reviewed.      Significant Labs:   CMP   Recent Labs  Lab 12/20/17 2055 12/21/17 0815 12/21/17 2000 12/22/17  0309     < > 140 140 139   K 3.8  < > 4.0 4.1 4.1     < > 110 110 109   CO2 25  < > 25 24 21*   *  < > 127* 118* 116*   BUN 14  < > 12 13 12   CREATININE 0.8  < > 0.9 0.9 0.8   CALCIUM 8.1*  < > 8.5* 8.8 8.5*   ALBUMIN 2.9*  --  2.9* 3.0*  --    ANIONGAP 5*  < > 5* 6* 9   ESTGFRAFRICA >60.0  < > >60.0 >60.0 >60.0   EGFRNONAA >60.0  < > >60.0 >60.0 >60.0   < > = values in this interval not displayed. and CBC     Recent Labs  Lab 12/21/17  0815 12/22/17  0309   WBC 8.91 10.20   HGB 11.4* 11.5*   HCT 34.2* 34.0*   * 134*       Significant Imaging: see below

## 2017-12-23 NOTE — ASSESSMENT & PLAN NOTE
- catheter directed thrombolysis 12/20  - No thrombus on in infra-renal IVC, left common iliac vein, or right common iliac vein  - 12/20 echo:    1 - Normal left ventricular systolic function (EF 60-65%).     2 - Mild tricuspid regurgitation.     3 - Normal left ventricular diastolic function.     4 - Low normal to mildly depressed right ventricular systolic function .     5 - Increased central venous pressure.   - Started on apixaban 10 mg BID x 7 days, followed by 5 mg BID for 3 months, prescriptions given

## 2017-12-23 NOTE — NURSING TRANSFER
Nursing Transfer Note      12/23/2017     Transfer To: [id number] From [id number]  Report given to Pina  Transfer via wheelchair    Transfer with cardiac monitoring    Transported by RN and PCT    Medicines sent: Heparin    Chart send with patient: Yes    Notified: spouse    Upon arrival to floor: call bell in reach and bed in lowest position. Pina Nurse receiving pt at bedside.

## 2017-12-23 NOTE — PLAN OF CARE
See vital signs and assessments in flowsheets. See below for updates on today's progress.      Pulmonary: Room Air to Nasal Cannula 2LPM overnight. O2 sats %, no SOB or dyspnea reported     Cardiovascular: Sinus Rhythm 60s-70s, no PVCs, no ectopies, good equal bilateral pulses, SBP 130s-150s, MAP>65, no chest pain reported.      Neurological: AAOx4, Afebrile     Gastrointestinal: with NG tube on low intermittent suction with yellowish output amounting to 1500ml for the whole shift. Abdomen still distended but more soft than it was at the start of the shift. On cardiac diet, no BM this shift     Genitourinary: Ambulatory, uses the toilet and urinal, good UOP     Integumentary/Other: Bilateral groin sites are clean, dry, intact     Infusions: N/A     Patient progressing towards goals as tolerated, plan of care communicated and reviewed with Milo Hart and family. All concerns addressed. Will continue to monitor.

## 2017-12-23 NOTE — PT/OT/SLP EVAL
Occupational Therapy   Evaluation and Discharge Note    Name: Milo Hart  MRN: [id number]  Admitting Diagnosis:  Small bowel obstruction      Recommendations:     Discharge Recommendations:  (Home no OT needs)  Discharge Equipment Recommendations:  none  Barriers to discharge:  None    History:     Occupational Profile:  Living Environment: Pt lives in [place] with wife in two story home with Bedroom upstairs; handrail on L  Previous level of function: (I)  Roles and Routines: retired  Equipment Owned:  none  Assistance upon Discharge: pt's wife can assist at d/c    Past Medical History:   Diagnosis Date    Hyperlipidemia     Hypertension     Sleep apnea        Past Surgical History:   Procedure Laterality Date    CHOLECYSTECTOMY         Subjective     Chief Complaint: None  Patient/Family stated goals: to get better  Communicated with: RN prior to session.  Pain/Comfort:  · Pain Rating 1: 0/10  · Pain Rating Post-Intervention 1: 0/10    Objective:     Patient found with: blood pressure cuff, telemetry, pulse ox (continuous), NG tube    General Precautions: Standard, fall   Orthopedic Precautions:    Braces:       Occupational Performance:    Bed Mobility:    · Patient completed Rolling/Turning to Right with modified independence  · Patient completed Scooting/Bridging with modified independence  · Patient completed Supine to Sit with modified independence    Functional Mobility/Transfers:  · Patient completed Sit <> Stand Transfer with supervision  with  no assistive device   · Patient completed Bed <> Chair Transfer using Step Transfer technique with supervision with no assistive device    Activities of Daily Living:  · Grooming: supervision standing at sink  · UB Dressing: supervision    · LB Dressing: supervision    · Toileting: supervision      Cognitive/Visual Perceptual:  Oriented to: Person, Place, Time and Situation  Follows Commands/attention: Follows multistep  commands  Communication:  "clear/fluent  Memory:  No Deficits noted  Safety awareness/insight to disability: intact  Coping skills/emotional control: Appropriate to situation      Physical Exam:  Postural examination/scapula alignment:    -       No postural abnormalities identified  Sensation:    -       Intact  Upper Extremity Range of Motion:     -       Right Upper Extremity: WFL  -       Left Upper Extremity: WFL  Upper Extremity Strength:    -       Right Upper Extremity: WFL  -       Left Upper Extremity: WFL   Strength:    -       Right Upper Extremity: WFL  -       Left Upper Extremity: WFL  Fine Motor Coordination:    -       Intact  Gross motor coordination:   WFL    Patient left up in chair with all lines intact, call button in reach, rn notified and spouse present    [facility] 6 Click:  [facility] Total Score: 20    Treatment & Education:  Pt ed on OT POC  Pt performed eval as above and completed functional mobility around unit with supervision and no c/o SOB  Education:    Assessment:     Milo Hart is a 82 y.o. male with a medical diagnosis of Small bowel obstruction. At this time, patient is functioning at their prior level of function and does not require further acute OT services.     Clinical Decision Makin.  OT Low:  "Pt evaluation falls under low complexity for evaluation coding due to performance deficits noted in 1-3 areas as stated above and 0 co-morbities affecting current functional status. Data obtained from problem focused assessments. No modifications or assistance was required for completion of evaluation. Only brief occupational profile and history review completed."     Plan:     During this hospitalization, patient does not require further acute OT services.  Please re-consult if situation changes.    · Plan of Care Reviewed with: patient, spouse    This Plan of care has been discussed with the patient who was involved in its development and understands and is in agreement with the identified goals and " treatment plan    GOALS:    Occupational Therapy Goals     Not on file          Multidisciplinary Problems (Resolved)        Problem: Occupational Therapy Goal    Goal Priority Disciplines Outcome Interventions   Occupational Therapy Goal   (Resolved)     OT, PT/OT Outcome(s) achieved                    Time Tracking:     OT Date of Treatment: 12/23/17  OT Start Time: 0810  OT Stop Time: 0829  OT Total Time (min): 19 min    Billable Minutes:Evaluation 9  Therapeutic Activity 10    KAVITA Blackman  12/23/2017

## 2017-12-23 NOTE — PROGRESS NOTES
[facility]  Cardiology  Progress Note    Patient Name: Milo Hart  MRN: [id number]  Admission Date: 12/19/2017  Hospital Length of Stay: 3 days  Code Status: Full Code   Attending Physician: Froilan Louis MD   Primary Care Physician: Primary Doctor No  Expected Discharge Date: 12/22/2017  Principal Problem:Pulmonary embolism    Subjective:     Hospital Course:   Catheter directed thrombolysis done 12/20. Angio max d/c'ed and sheaths pulled later that night. Procedure tolerated well. Next day, started apixaban 10 mg BID x 7 days followed by 5 mg BID for 3 months. Stable from cardiac standpoint for d/c. Prior to d/c pt had nonbloody emesis x 1. Abd xray concerning for SBO. CT abd/pelvis ordered.      Review of Systems   Constitution: Negative for diaphoresis and weakness.   Cardiovascular: Negative for chest pain, dyspnea on exertion and irregular heartbeat.   Respiratory: Negative for cough, shortness of breath and wheezing.    Gastrointestinal: Positive for vomiting (non-bloody x 1). Negative for abdominal pain, constipation, diarrhea, melena and nausea.     Objective:     Vital Signs (Most Recent):  Temp: 98.1 °F (36.7 °C) (12/22/17 1839)  Pulse: 76 (12/22/17 1700)  Resp: (!) 25 (12/22/17 1700)  BP: (!) 145/67 (12/22/17 1700)  SpO2: (!) 94 % (12/22/17 1700) Vital Signs (24h Range):  Temp:  [98 °F (36.7 °C)-98.4 °F (36.9 °C)] 98.1 °F (36.7 °C)  Pulse:  [65-83] 76  Resp:  [18-31] 25  SpO2:  [89 %-98 %] 94 %  BP: (137-171)/(63-98) 145/67     Weight: 102.4 kg (225 lb 12 oz)  Body mass index is 32.39 kg/m².     SpO2: (!) 94 %  O2 Device (Oxygen Therapy): room air      Intake/Output Summary (Last 24 hours) at 12/22/17 1958  Last data filed at 12/22/17 1600   Gross per 24 hour   Intake              840 ml   Output             2290 ml   Net            -1450 ml       Lines/Drains/Airways     Peripheral Intravenous Line                 Peripheral IV - Single Lumen 12/19/17 Left Antecubital 3  days         Peripheral IV - Single Lumen 12/19/17 2016 Right Antecubital 2 days                Physical Exam   Constitutional: He is oriented to person, place, and time. He appears well-developed and well-nourished. No distress.   HENT:   Head: Normocephalic and atraumatic.   Eyes: Conjunctivae are normal. Right eye exhibits no discharge. Left eye exhibits no discharge.   Neck: Normal range of motion. Neck supple.   Cardiovascular: Normal rate and regular rhythm.    Pulmonary/Chest: Effort normal and breath sounds normal.   Abdominal: Soft. Bowel sounds are normal. He exhibits distension. There is no tenderness. There is no rebound and no guarding.   Musculoskeletal: Normal range of motion. He exhibits no edema.   Neurological: He is alert and oriented to person, place, and time.   Skin: Skin is warm and dry. He is not diaphoretic.   Psychiatric: He has a normal mood and affect. His behavior is normal.   Vitals reviewed.      Significant Labs:   CMP   Recent Labs  Lab 12/20/17 2055 12/21/17 0815 12/21/17 2000 12/22/17  0309     < > 140 140 139   K 3.8  < > 4.0 4.1 4.1     < > 110 110 109   CO2 25  < > 25 24 21*   *  < > 127* 118* 116*   BUN 14  < > 12 13 12   CREATININE 0.8  < > 0.9 0.9 0.8   CALCIUM 8.1*  < > 8.5* 8.8 8.5*   ALBUMIN 2.9*  --  2.9* 3.0*  --    ANIONGAP 5*  < > 5* 6* 9   ESTGFRAFRICA >60.0  < > >60.0 >60.0 >60.0   EGFRNONAA >60.0  < > >60.0 >60.0 >60.0   < > = values in this interval not displayed. and CBC     Recent Labs  Lab 12/21/17  0815 12/22/17  0309   WBC 8.91 10.20   HGB 11.4* 11.5*   HCT 34.2* 34.0*   * 134*       Significant Imaging: see below    Assessment and Plan:     * Submassive Pulmonary embolism    - catheter directed thrombolysis 12/20  - No thrombus on in infra-renal IVC, left common iliac vein, or right common iliac vein  - 12/20 echo:    1 - Normal left ventricular systolic function (EF 60-65%).     2 - Mild tricuspid regurgitation.     3 - Normal  left ventricular diastolic function.     4 - Low normal to mildly depressed right ventricular systolic function .     5 - Increased central venous pressure.   - Started on apixaban 10 mg BID x 7 days, followed by 5 mg BID for 3 months, prescriptions given        Abdominal distension    - distention w/non-bloody emesis x 1, denies nausea. No tenderness to palpation, BS x 4, passing gas and having BMs  - abd xray- concerning for SBO  - CT abd/pelvis ordered        BPH (benign prostatic hyperplasia)    Tamsulosin daily        Hyperlipidemia    Simvastatin 40 mg daily        Hypertension    Home meds: HCTZ 12.5 and irbesartan 75 mg daily  Restarted             VTE Risk Mitigation         Ordered     apixaban tablet 5 mg  2 times daily     Route:  Oral        12/21/17 1048     apixaban tablet 10 mg  2 times daily     Route:  Oral        12/21/17 1048     High Risk of VTE  Once      12/19/17 1930          Urvashi Dillon MD  Cardiology  [facility]

## 2017-12-23 NOTE — SUBJECTIVE & OBJECTIVE
Interval History:   Abdominal distension, KUB concerning for ileus vs SBO  Surgery consulted and following  CTA abd with no obstruction or bowel ischemia  NPO NGT    Review of Systems   Constitution: Negative for diaphoresis and weakness.   Cardiovascular: Negative for chest pain, dyspnea on exertion and irregular heartbeat.   Respiratory: Negative for cough, shortness of breath and wheezing.    Gastrointestinal: Positive for vomiting (non-bloody x 1). Negative for abdominal pain, constipation, diarrhea, melena and nausea.     Objective:     Vital Signs (Most Recent):  Temp: 97.8 °F (36.6 °C) (12/23/17 1459)  Pulse: (!) 57 (12/23/17 1500)  Resp: 18 (12/23/17 1459)  BP: 131/64 (12/23/17 1459)  SpO2: 96 % (12/23/17 1459) Vital Signs (24h Range):  Temp:  [97.8 °F (36.6 °C)-98.4 °F (36.9 °C)] 97.8 °F (36.6 °C)  Pulse:  [57-86] 57  Resp:  [17-32] 18  SpO2:  [90 %-100 %] 96 %  BP: (129-168)/(60-74) 131/64     Weight: 102.7 kg (226 lb 7.7 oz)  Body mass index is 32.5 kg/m².     SpO2: 96 %  O2 Device (Oxygen Therapy): room air      Intake/Output Summary (Last 24 hours) at 12/23/17 1614  Last data filed at 12/23/17 1000   Gross per 24 hour   Intake           314.16 ml   Output             2500 ml   Net         -2185.84 ml       Lines/Drains/Airways     Drain                 NG/OG Tube 12/22/17 2030 18 Fr. Left nostril less than 1 day          Peripheral Intravenous Line                 Peripheral IV - Single Lumen 12/19/17 Left Antecubital 4 days         Peripheral IV - Single Lumen 12/19/17 2016 Right Antecubital 3 days                Physical Exam   Constitutional: He is oriented to person, place, and time. He appears well-developed and well-nourished. No distress.   HENT:   Head: Normocephalic and atraumatic.   Eyes: Conjunctivae are normal. Right eye exhibits no discharge. Left eye exhibits no discharge.   Neck: Normal range of motion. Neck supple.   Cardiovascular: Normal rate and regular rhythm.    Pulmonary/Chest:  Effort normal and breath sounds normal.   Abdominal: Soft. Bowel sounds are normal. He exhibits distension. There is no tenderness. There is no rebound and no guarding.   Musculoskeletal: Normal range of motion. He exhibits no edema.   Neurological: He is alert and oriented to person, place, and time.   Skin: Skin is warm and dry. He is not diaphoretic.   Psychiatric: He has a normal mood and affect. His behavior is normal.   Vitals reviewed.

## 2017-12-23 NOTE — PLAN OF CARE
Problem: Occupational Therapy Goal  Goal: Occupational Therapy Goal  Outcome: Outcome(s) achieved Date Met: 12/23/17  OT eval completed. Pt is performing ADL safely and without A. No further skilled OT services warranted. KAVITA Blackman  12/23/2017

## 2017-12-23 NOTE — ASSESSMENT & PLAN NOTE
- distention w/non-bloody emesis x 1, denies nausea. No tenderness to palpation, BS x 4, passing gas and having BMs  - abd xray- concerning for SBO  - CT abd/pelvis ordered  Surgery following  NPO NGT to suction

## 2017-12-23 NOTE — CONSULTS
"[facility]  General Surgery  Consult Note    Patient Name: Milo Hart  MRN: [id number]  Code Status: Full Code  Admission Date: 12/19/2017  Hospital Length of Stay: 3 days  Attending Physician: Froilan Louis MD  Primary Care Provider: Primary Doctor No    Patient information was obtained from patient, spouse/SO, relative(s), past medical records and ER records.     Inpatient consult to General Surgery  Consult performed by: JOÃO ODOM JR.  Consult ordered by: JONAS HUTCHISON  Reason for consult: abdominal distention and x-ray concerning for obstruction        Subjective:     Principal Problem: Small bowel obstruction    History of Present Illness: Milo Hart is a 82 y.o. male with h/o HTN and HLD, who developed acute onset shortness of breath and chest discomfort prior to plane ride from [place] to [place]; after landing, his symptoms acutely worsened.  He presented to an OSH where he was diagnosed with bilateral segmental PEs.  He was transferred to [facility] for management.  He was started on heparin gtt and underwent a catheter directed thrombolysis.  He had progressed well over the past couple of days and was nearing discharge on Eliquis when he developed nausea and vomiting today.  He and his wife state that he is "a little robust" around the waist at baseline, but they both noticed some abdominal distention yesterday afternoon.  He was passing flatus and having BMs and was tolerating diet well so they thought nothing of it.  Today, he had little appetite for breakfast or lunch.  He went on a walk this afternoon and developed nausea and bilious emesis.  An abdominal x-ray was obtained showing an obstructive bowel gas pattern. He reports decreased flatus today and he only had a very small BM.  He denies fever, chills, abdominal pain, abdominal or groin bulge, dysuria, hematuria, decreased urine volume, weight loss, chest pain, shortness of breath, blood in stool.  Last " "colonoscopy was 4 years ago and reportedly was "normal"; he was told to have another in 5 years.  No family history of colon cancer.  His abdominal surgical history consists of a lap alhaji in 2000, which was reportedly uncomplicated.  We have no medical records here as he is from [place].    No current facility-administered medications on file prior to encounter.      No current outpatient prescriptions on file prior to encounter.       Review of patient's allergies indicates:  No Known Allergies    Past Medical History:   Diagnosis Date    Hyperlipidemia     Hypertension     Sleep apnea      Past Surgical History:   Procedure Laterality Date    CHOLECYSTECTOMY       Family History     None        Social History Main Topics    Smoking status: Never Smoker    Smokeless tobacco: Not on file    Alcohol use No    Drug use: No    Sexual activity: Not on file     Review of Systems   Constitutional: Positive for appetite change. Negative for activity change, chills, fever and unexpected weight change.   HENT: Negative.    Eyes: Negative.    Respiratory: Negative for cough and shortness of breath.    Cardiovascular: Negative for chest pain and palpitations.   Gastrointestinal: Positive for abdominal distention, nausea and vomiting. Negative for abdominal pain, blood in stool, constipation and diarrhea.   Genitourinary: Positive for difficulty urinating (known prostatic hypertrophy). Negative for dysuria and hematuria.   Musculoskeletal: Negative.    Skin: Negative.    Neurological: Negative.    Hematological: Bruises/bleeds easily (on oral anticoagulation).     Objective:     Vital Signs (Most Recent):  Temp: 98.1 °F (36.7 °C) (12/22/17 1839)  Pulse: 76 (12/22/17 1700)  Resp: (!) 25 (12/22/17 1700)  BP: (!) 145/67 (12/22/17 1700)  SpO2: (!) 94 % (12/22/17 1700) Vital Signs (24h Range):  Temp:  [98 °F (36.7 °C)-98.4 °F (36.9 °C)] 98.1 °F (36.7 °C)  Pulse:  [65-83] 76  Resp:  [18-31] 25  SpO2:  [89 %-98 %] 94 " %  BP: (137-171)/(63-74) 145/67     Weight: 102.4 kg (225 lb 12 oz)  Body mass index is 32.39 kg/m².    Physical Exam   Constitutional: He is oriented to person, place, and time. He appears well-developed and well-nourished. No distress.   HENT:   Head: Normocephalic and atraumatic.   Eyes: Conjunctivae and EOM are normal. No scleral icterus.   Neck: Normal range of motion.   Cardiovascular: Normal rate and regular rhythm.    Pulmonary/Chest: Effort normal and breath sounds normal. No respiratory distress.   Abdominal: Soft. He exhibits distension (tympanic on percussion). He exhibits no mass. Bowel sounds are decreased. There is no tenderness. There is no guarding. No hernia.   Musculoskeletal: Normal range of motion. He exhibits no edema.   Neurological: He is alert and oriented to person, place, and time.   Skin: Skin is warm and dry. Capillary refill takes less than 2 seconds. He is not diaphoretic.   Psychiatric: He has a normal mood and affect.   Nursing note and vitals reviewed.      Significant Labs:  CBC:   Recent Labs  Lab 12/22/17  0309   WBC 10.20   RBC 3.56*   HGB 11.5*   HCT 34.0*   *   MCV 96   MCH 32.3*   MCHC 33.8     CMP:   Recent Labs  Lab 12/19/17 1958 12/21/17 2000 12/22/17  0309   *  < > 118* 116*   CALCIUM 9.2  < > 8.8 8.5*   ALBUMIN 3.4*  < > 3.0*  --    PROT 7.0  --   --   --      < > 140 139   K 3.6  < > 4.1 4.1   CO2 26  < > 24 21*     < > 110 109   BUN 14  < > 13 12   CREATININE 0.9  < > 0.9 0.8   ALKPHOS 74  --   --   --    ALT 19  --   --   --    AST 18  --   --   --    BILITOT 0.9  --   --   --    < > = values in this interval not displayed.    Significant Diagnostics:  Abdominal x-ray: large gastric bubble; obstructive bowel gas pattern; some gas and stool visible in colon    Assessment/Plan:     * Small bowel obstruction    -History and exam consistent with small bowel obstruction vs ileus; possible that obstruction is partial given that he is still  passing some flatus  -Recommend complete bowel rest with NG tube (18 Fr - discussed with nurse) decompression, including no oral medications  -Will need to convert back to IV heparin for anticoagulation for now  -Patient is agreeable to this and he understands that there is risk for nose bleed given his anticoagulation  -Will obtain CT abdomen and pelvis with IV contrast to assess etiology of obstruction; his surgical history includes only a lap alhaji.  Will want to rule out obstructing lesion as well as SMA thrombosis (being treated for likely unprovoked DVT)          VTE Risk Mitigation         Ordered     apixaban tablet 5 mg  2 times daily     Route:  Oral        12/21/17 1048     apixaban tablet 10 mg  2 times daily     Route:  Oral        12/21/17 1048     High Risk of VTE  Once      12/19/17 1930          Thank you for your consult. I will follow-up with patient. Please contact us if you have any additional questions.    Jamaal Hubbard Jr., MD  General Surgery  [facility] [address]

## 2017-12-23 NOTE — PROGRESS NOTES
"Progress Note  [facility] [place] [facility] [place]      [place] Date: 12/19/2017    SUBJECTIVE:     Follow-up For:  Small bowel obstruction    HPI: Mr. Hart is an 82 y.o. man from [place], [place] with HTN, HLD, but otherwise healthy who traveled here to [place] for [place] and developed SOB and chest pain on 12/18 shortly before he boarded the [place] [facility] [place]5 [place] flight), then drove 100 miles to [place], [place] and the pain/SOB worsened so he went to the local ED at [facility] ([place], [place]) and was found to have massive PE and B DVTs so given Lovenox SQ then transferred here to our ED and admitted to CCU for catheter-directed tPA    Per CCU HPI, "No shortness of breath at rest - mostly with exertion, no palpitations, no syncope. Denies any recent surgeries, hx of cancer, family hx of blood clots, prolonged immobilization. W/u at OSH notable for bilateral segmental PE just at the take off from right and left main PA.  Troponin elevated at OSH, also noted to have DVT.  Currently HR 70, /78, satting 93% on 3L NC breathing comfortably speaking complete sentences.  Bedside TTE in the ED with mild-moderately depressed RV, moderately enlarged RV, septal flattening in diastole with collapsing IVC."     Hospital Course: see CCU notes    Interval History: [place] [facility] [place] NGT clamped since last night, had BM. I spoke with marco (resident), gen surg eval this am and afteroon. This afternoon, doing well. NGT D/C-ED, trial of clears    Review of Systems:  Pain Scale: 0 /10   Constitutional: no fever or chills  Respiratory: no cough or shortness of breath  Cardiovascular: no chest pain or palpitations  Gastrointestinal: no nausea or vomiting, no abdominal pain or change in bowel habits  Genitourinary: no hematuria or dysuria  Integument/Breast: no rash or pruritis  Hematologic/Lymphatic: no easy bruising or lymphadenopathy  Musculoskeletal: no arthralgias or myalgias  Neurological: no seizures or " tremors  Behavioral/Psych: no depression or anxiety    OBJECTIVE:     Vital Signs Range (Last 24H):  Temp:  [97.4 °F (36.3 °C)-98.2 °F (36.8 °C)]   Pulse:  [57-77]   Resp:  [18-19]   BP: (131-141)/(64-70)   SpO2:  [92 %-96 %]     I & O (Last 24H):    Intake/Output Summary (Last 24 hours) at 12/24/17 1212  Last data filed at 12/24/17 0835   Gross per 24 hour   Intake                0 ml   Output              550 ml   Net             -550 ml       Physical Exam:  General appearance: no distress  Mental status: Alert and oriented x 3  HEENT:  conjunctivae/corneas clear, PERRL  Neck: supple, thyroid not enlarged  Pulm:   normal respiratory effort, CTA B, no c/w/r  Card: RRR, S1, S2 normal, no murmur, click, rub or gallop  Abd: soft, NT, +distended, BS present; no masses, no organomegaly  Ext: no c/c/e  Pulses: 2+, symmetric  Skin: color, texture, turgor normal. No rashes or lesions  Neuro: CN II-XII grossly intact, no focal numbness or weakness, normal strength and tone     Diagnostic Results:  Labs reviewed    Recent Results (from the past 24 hour(s))   Anti-Xa Heparin Monitoring    Collection Time: 12/23/17  1:51 PM   Result Value Ref Range    Heparin Anti-Xa 1.64 (H) 0.30 - 0.70 IU/mL   Anti-Xa Heparin Monitoring    Collection Time: 12/23/17  5:10 PM   Result Value Ref Range    Heparin Anti-Xa 1.59 (H) 0.30 - 0.70 IU/mL   Anti-Xa Heparin Monitoring    Collection Time: 12/23/17  8:29 PM   Result Value Ref Range    Heparin Anti-Xa 1.48 (H) 0.30 - 0.70 IU/mL   Basic metabolic panel     Collection Time: 12/24/17  4:54 AM   Result Value Ref Range    Sodium 141 136 - 145 mmol/L    Potassium 3.6 3.5 - 5.1 mmol/L    Chloride 109 95 - 110 mmol/L    CO2 24 23 - 29 mmol/L    Glucose 109 70 - 110 mg/dL    BUN, Bld 16 8 - 23 mg/dL    Creatinine 0.8 0.5 - 1.4 mg/dL    Calcium 8.5 (L) 8.7 - 10.5 mg/dL    Anion Gap 8 8 - 16 mmol/L    eGFR if African American >60.0 >60 mL/min/1.73 m^2    eGFR if non African American >60.0 >60  mL/min/1.73 m^2   Protime-INR - if on Coumadin    Collection Time: 12/24/17  4:54 AM   Result Value Ref Range    Prothrombin Time 11.2 9.0 - 12.5 sec    INR 1.1 0.8 - 1.2   CBC auto differential    Collection Time: 12/24/17  4:54 AM   Result Value Ref Range    WBC 7.13 3.90 - 12.70 K/uL    RBC 3.64 (L) 4.60 - 6.20 M/uL    Hemoglobin 11.5 (L) 14.0 - 18.0 g/dL    Hematocrit 34.0 (L) 40.0 - 54.0 %    MCV 93 82 - 98 fL    MCH 31.6 (H) 27.0 - 31.0 pg    MCHC 33.8 32.0 - 36.0 g/dL    RDW 12.7 11.5 - 14.5 %    Platelets 156 150 - 350 K/uL    MPV 11.0 9.2 - 12.9 fL    Immature Granulocytes 0.4 0.0 - 0.5 %    Gran # 4.8 1.8 - 7.7 K/uL    Immature Grans (Abs) 0.03 0.00 - 0.04 K/uL    Lymph # 1.1 1.0 - 4.8 K/uL    Mono # 1.0 0.3 - 1.0 K/uL    Eos # 0.2 0.0 - 0.5 K/uL    Baso # 0.04 0.00 - 0.20 K/uL    nRBC 0 0 /100 WBC    Gran% 67.8 38.0 - 73.0 %    Lymph% 15.0 (L) 18.0 - 48.0 %    Mono% 13.3 4.0 - 15.0 %    Eosinophil% 2.9 0.0 - 8.0 %    Basophil% 0.6 0.0 - 1.9 %    Differential Method Automated    Renal function panel    Collection Time: 12/24/17  4:54 AM   Result Value Ref Range    Glucose 110 70 - 110 mg/dL    Sodium 142 136 - 145 mmol/L    Potassium 3.6 3.5 - 5.1 mmol/L    Chloride 109 95 - 110 mmol/L    CO2 24 23 - 29 mmol/L    BUN, Bld 17 8 - 23 mg/dL    Calcium 8.6 (L) 8.7 - 10.5 mg/dL    Creatinine 0.8 0.5 - 1.4 mg/dL    Albumin 2.7 (L) 3.5 - 5.2 g/dL    Phosphorus 2.2 (L) 2.7 - 4.5 mg/dL    eGFR if African American >60.0 >60 mL/min/1.73 m^2    eGFR if non African American >60.0 >60 mL/min/1.73 m^2    Anion Gap 9 8 - 16 mmol/L   CBC auto differential    Collection Time: 12/24/17  4:54 AM   Result Value Ref Range    WBC 7.13 3.90 - 12.70 K/uL    RBC 3.64 (L) 4.60 - 6.20 M/uL    Hemoglobin 11.5 (L) 14.0 - 18.0 g/dL    Hematocrit 34.0 (L) 40.0 - 54.0 %    MCV 93 82 - 98 fL    MCH 31.6 (H) 27.0 - 31.0 pg    MCHC 33.8 32.0 - 36.0 g/dL    RDW 12.7 11.5 - 14.5 %    Platelets 156 150 - 350 K/uL    MPV 11.0 9.2 - 12.9 fL     Immature Granulocytes 0.4 0.0 - 0.5 %    Gran # 4.8 1.8 - 7.7 K/uL    Immature Grans (Abs) 0.03 0.00 - 0.04 K/uL    Lymph # 1.1 1.0 - 4.8 K/uL    Mono # 1.0 0.3 - 1.0 K/uL    Eos # 0.2 0.0 - 0.5 K/uL    Baso # 0.04 0.00 - 0.20 K/uL    nRBC 0 0 /100 WBC    Gran% 67.8 38.0 - 73.0 %    Lymph% 15.0 (L) 18.0 - 48.0 %    Mono% 13.3 4.0 - 15.0 %    Eosinophil% 2.9 0.0 - 8.0 %    Basophil% 0.6 0.0 - 1.9 %    Differential Method Automated    Anti-Xa Heparin Monitoring    Collection Time: 12/24/17  4:54 AM   Result Value Ref Range    Heparin Anti-Xa 1.21 (H) 0.30 - 0.70 IU/mL           ASSESSMENT/PLAN:     Ileus, idiopathic. CT unremarkable, passing flatus throughout, no SMA thrombus  - Abd distention much improved, NGT output minimal  - Clamp NGT for 1 hour and give morning meds  - Discussed with surgery Dr Pa, appreciate input  - Clamp NGT at 9pm, give contrast via NGT, then timed KUB at 5am tomorrow (8 hrs after contrast given)  - if no improvement or worsening, may need EGD     Saddle PE s/p tPA- unprovoked - doing great now  - continue hep gtt for now pending any potential procedures by surgery or GI (unlikely) then resume apixaban BID  - outpt hypercoagulable workup ASAP with outside PCP or Hematologist as I will not be able to follow him in clinic and most labs would be pending at time of d/c home  - up to date on age-appropriate malignancy screening (negative c-scope 2013), but had chronic PSA elevation that was no longer followed due to his age, so recommend to re-visit with his PCP and Urologist back in [place]     Anemia - stable, monitor    HypoK - replace PO  Hypophos - replace PO    Prophylaxis- already on hep gtt    Advance directives - full code    Seen/examined by my partner, Dr Lazna    Post-acute care- pending clinical condition, d/c home    Time spent in care of patient: 40    Jackie Johns MD  [facility] Medicine Staff

## 2017-12-23 NOTE — ASSESSMENT & PLAN NOTE
-History and exam consistent with small bowel obstruction vs ileus; possible that obstruction is partial given that he is still passing some flatus  -Recommend complete bowel rest with NG tube (18 Fr - discussed with nurse) decompression, including no oral medications  -Will need to convert back to IV heparin for anticoagulation for now  -Patient is agreeable to this and he understands that there is risk for nose bleed given his anticoagulation  -Will obtain CT abdomen and pelvis with IV contrast to assess etiology of obstruction; his surgical history includes only a lap alhaji.  Will want to rule out obstructing lesion as well as SMA thrombosis (being treated for likely unprovoked DVT)

## 2017-12-24 LAB
ALBUMIN SERPL BCP-MCNC: 2.7 G/DL
ANION GAP SERPL CALC-SCNC: 8 MMOL/L
ANION GAP SERPL CALC-SCNC: 9 MMOL/L
BASOPHILS # BLD AUTO: 0.04 K/UL
BASOPHILS # BLD AUTO: 0.04 K/UL
BASOPHILS NFR BLD: 0.6 %
BASOPHILS NFR BLD: 0.6 %
BUN SERPL-MCNC: 16 MG/DL
BUN SERPL-MCNC: 17 MG/DL
CALCIUM SERPL-MCNC: 8.5 MG/DL
CALCIUM SERPL-MCNC: 8.6 MG/DL
CHLORIDE SERPL-SCNC: 109 MMOL/L
CHLORIDE SERPL-SCNC: 109 MMOL/L
CO2 SERPL-SCNC: 24 MMOL/L
CO2 SERPL-SCNC: 24 MMOL/L
CREAT SERPL-MCNC: 0.8 MG/DL
CREAT SERPL-MCNC: 0.8 MG/DL
DIFFERENTIAL METHOD: ABNORMAL
DIFFERENTIAL METHOD: ABNORMAL
EOSINOPHIL # BLD AUTO: 0.2 K/UL
EOSINOPHIL # BLD AUTO: 0.2 K/UL
EOSINOPHIL NFR BLD: 2.9 %
EOSINOPHIL NFR BLD: 2.9 %
ERYTHROCYTE [DISTWIDTH] IN BLOOD BY AUTOMATED COUNT: 12.7 %
ERYTHROCYTE [DISTWIDTH] IN BLOOD BY AUTOMATED COUNT: 12.7 %
EST. GFR  (AFRICAN AMERICAN): >60 ML/MIN/1.73 M^2
EST. GFR  (AFRICAN AMERICAN): >60 ML/MIN/1.73 M^2
EST. GFR  (NON AFRICAN AMERICAN): >60 ML/MIN/1.73 M^2
EST. GFR  (NON AFRICAN AMERICAN): >60 ML/MIN/1.73 M^2
FACT X PPP CHRO-ACNC: 0.75 IU/ML
FACT X PPP CHRO-ACNC: 1.21 IU/ML
GLUCOSE SERPL-MCNC: 109 MG/DL
GLUCOSE SERPL-MCNC: 110 MG/DL
HCT VFR BLD AUTO: 34 %
HCT VFR BLD AUTO: 34 %
HGB BLD-MCNC: 11.5 G/DL
HGB BLD-MCNC: 11.5 G/DL
IMM GRANULOCYTES # BLD AUTO: 0.03 K/UL
IMM GRANULOCYTES # BLD AUTO: 0.03 K/UL
IMM GRANULOCYTES NFR BLD AUTO: 0.4 %
IMM GRANULOCYTES NFR BLD AUTO: 0.4 %
INR PPP: 1.1
LYMPHOCYTES # BLD AUTO: 1.1 K/UL
LYMPHOCYTES # BLD AUTO: 1.1 K/UL
LYMPHOCYTES NFR BLD: 15 %
LYMPHOCYTES NFR BLD: 15 %
MCH RBC QN AUTO: 31.6 PG
MCH RBC QN AUTO: 31.6 PG
MCHC RBC AUTO-ENTMCNC: 33.8 G/DL
MCHC RBC AUTO-ENTMCNC: 33.8 G/DL
MCV RBC AUTO: 93 FL
MCV RBC AUTO: 93 FL
MONOCYTES # BLD AUTO: 1 K/UL
MONOCYTES # BLD AUTO: 1 K/UL
MONOCYTES NFR BLD: 13.3 %
MONOCYTES NFR BLD: 13.3 %
NEUTROPHILS # BLD AUTO: 4.8 K/UL
NEUTROPHILS # BLD AUTO: 4.8 K/UL
NEUTROPHILS NFR BLD: 67.8 %
NEUTROPHILS NFR BLD: 67.8 %
NRBC BLD-RTO: 0 /100 WBC
NRBC BLD-RTO: 0 /100 WBC
PHOSPHATE SERPL-MCNC: 2.2 MG/DL
PLATELET # BLD AUTO: 156 K/UL
PLATELET # BLD AUTO: 156 K/UL
PMV BLD AUTO: 11 FL
PMV BLD AUTO: 11 FL
POTASSIUM SERPL-SCNC: 3.6 MMOL/L
POTASSIUM SERPL-SCNC: 3.6 MMOL/L
PROTHROMBIN TIME: 11.2 SEC
RBC # BLD AUTO: 3.64 M/UL
RBC # BLD AUTO: 3.64 M/UL
SODIUM SERPL-SCNC: 141 MMOL/L
SODIUM SERPL-SCNC: 142 MMOL/L
WBC # BLD AUTO: 7.13 K/UL
WBC # BLD AUTO: 7.13 K/UL

## 2017-12-24 PROCEDURE — 85025 COMPLETE CBC W/AUTO DIFF WBC: CPT

## 2017-12-24 PROCEDURE — 80048 BASIC METABOLIC PNL TOTAL CA: CPT

## 2017-12-24 PROCEDURE — 25000003 PHARM REV CODE 250: Performed by: HOSPITALIST

## 2017-12-24 PROCEDURE — 80069 RENAL FUNCTION PANEL: CPT

## 2017-12-24 PROCEDURE — 63600175 PHARM REV CODE 636 W HCPCS: Performed by: HOSPITALIST

## 2017-12-24 PROCEDURE — 99233 SBSQ HOSP IP/OBS HIGH 50: CPT | Mod: ,,, | Performed by: HOSPITALIST

## 2017-12-24 PROCEDURE — 20600001 HC STEP DOWN PRIVATE ROOM

## 2017-12-24 PROCEDURE — A4216 STERILE WATER/SALINE, 10 ML: HCPCS | Performed by: STUDENT IN AN ORGANIZED HEALTH CARE EDUCATION/TRAINING PROGRAM

## 2017-12-24 PROCEDURE — 36415 COLL VENOUS BLD VENIPUNCTURE: CPT

## 2017-12-24 PROCEDURE — 99232 SBSQ HOSP IP/OBS MODERATE 35: CPT | Mod: GC,,, | Performed by: SURGERY

## 2017-12-24 PROCEDURE — 25000003 PHARM REV CODE 250: Performed by: STUDENT IN AN ORGANIZED HEALTH CARE EDUCATION/TRAINING PROGRAM

## 2017-12-24 PROCEDURE — 85610 PROTHROMBIN TIME: CPT

## 2017-12-24 PROCEDURE — 85520 HEPARIN ASSAY: CPT

## 2017-12-24 RX ORDER — AMLODIPINE BESYLATE 5 MG/1
5 TABLET ORAL DAILY
Status: DISCONTINUED | OUTPATIENT
Start: 2017-12-24 | End: 2017-12-25 | Stop reason: HOSPADM

## 2017-12-24 RX ADMIN — DEXTROSE AND SODIUM CHLORIDE: 5; .9 INJECTION, SOLUTION INTRAVENOUS at 01:12

## 2017-12-24 RX ADMIN — AMLODIPINE BESYLATE 5 MG: 5 TABLET ORAL at 09:12

## 2017-12-24 RX ADMIN — HEPARIN SODIUM AND DEXTROSE 7 UNITS/KG/HR: 10000; 5 INJECTION INTRAVENOUS at 07:12

## 2017-12-24 RX ADMIN — Medication 3 ML: at 09:12

## 2017-12-24 RX ADMIN — OXYBUTYNIN CHLORIDE 10 MG: 10 TABLET, FILM COATED, EXTENDED RELEASE ORAL at 08:12

## 2017-12-24 RX ADMIN — SIMVASTATIN 40 MG: 40 TABLET, FILM COATED ORAL at 08:12

## 2017-12-24 RX ADMIN — IRBESARTAN 75 MG: 75 TABLET ORAL at 08:12

## 2017-12-24 RX ADMIN — Medication 3 ML: at 02:12

## 2017-12-24 RX ADMIN — HEPARIN SODIUM AND DEXTROSE 8 UNITS/KG/HR: 10000; 5 INJECTION INTRAVENOUS at 08:12

## 2017-12-24 RX ADMIN — Medication 3 ML: at 05:12

## 2017-12-24 NOTE — PLAN OF CARE
Seen/eval earlier    Ileus, idiopathic. CT unremarkable, passing flatus throughout, no SMA thrombus  - Abd distention much improved, NGT output minimal  - Clamp NGT for 1 hour and give morning meds  - Discussed with surgery Dr Pa, appreciate input  - Clamp NGT at 9pm, give contrast via NGT, then timed KUB at 5am tomorrow (8 hrs after contrast given)  - if no improvement or worsening, may need EGD    Saddle PE s/p tPA- unprovoked - doing great now  - continue hep gtt for now pending any potential procedures by surgery or GI (unlikely) then resume apixaban BID  - outpt hypercoagulable workup ASAP with outside PCP or Hematologist as I will not be able to follow him in clinic and most labs would be pending at time of d/c home  - up to date on age-appropriate malignancy screening (negative c-scope 2013), but had chronic PSA elevation that was no longer followed due to his age, so recommend to re-visit with his PCP and Urologist back in [place]    Post-acute care- possible d/c home tomorrow afternoon or the next day if ileus resolved and tolerating PO    Discussed with him, wife, and son at bedside

## 2017-12-24 NOTE — NURSING
Results for CLARISSA THOMAS (MRN [id number]) as of 12/23/2017 18:01   Ref. Range 12/23/2017 03:14 12/23/2017 03:14 12/23/2017 08:34 12/23/2017 13:51 12/23/2017 17:10   Heparin Anti-Xa Latest Ref Range: 0.30 - 0.70 IU/mL   1.64 (H) 1.64 (H) 1.59 (H)   Tressa Johns M.D. for further instructions.  Ordered to hold x 3 hours and collect Anti-Xa at 2100 hours.

## 2017-12-24 NOTE — PROGRESS NOTES
Mod to large BM noted. Family remains at bedside. Updated on plan of care and all questions answered

## 2017-12-24 NOTE — SUBJECTIVE & OBJECTIVE
Interval History: No significant issues overnight. Has had 2 BMs today. No nausea or vomiting. Not passing very much flatus. No significant NGT output. Denies any abdominal pain.    Medications:  Continuous Infusions:   dextrose 5 % and 0.9 % NaCl 75 mL/hr at 12/23/17 2223    heparin (porcine) in D5W 8 Units/kg/hr (12/24/17 0841)     Scheduled Meds:   irbesartan  75 mg Oral Daily    oxybutynin  10 mg Oral Daily    simvastatin  40 mg Oral QHS    sodium chloride 0.9%  3 mL Intravenous Q8H    tamsulosin  0.4 mg Oral Daily     PRN Meds:acetaminophen, magnesium oxide, magnesium oxide, potassium chloride 10%, potassium chloride 10%, potassium chloride 10%, potassium, sodium phosphates, potassium, sodium phosphates, potassium, sodium phosphates, simethicone, traZODone     Review of patient's allergies indicates:  No Known Allergies  Objective:     Vital Signs (Most Recent):  Temp: 98.2 °F (36.8 °C) (12/24/17 0741)  Pulse: 74 (12/24/17 0741)  Resp: 18 (12/24/17 0741)  BP: 139/64 (12/24/17 0741)  SpO2: (!) 94 % (12/24/17 0741) Vital Signs (24h Range):  Temp:  [97.4 °F (36.3 °C)-98.2 °F (36.8 °C)] 98.2 °F (36.8 °C)  Pulse:  [57-77] 74  Resp:  [18-20] 18  SpO2:  [92 %-96 %] 94 %  BP: (131-141)/(62-67) 139/64     Weight: 94.1 kg (207 lb 7.3 oz)  Body mass index is 29.77 kg/m².    Intake/Output - Last 3 Shifts       12/22 0700 - 12/23 0659 12/23 0700 - 12/24 0659 12/24 0700 - 12/25 0659    P.O. 540 0     I.V. (mL/kg)  14.2 (0.1)     Total Intake(mL/kg) 540 (5.3) 14.2 (0.1)     Urine (mL/kg/hr) 1640 (0.7) 200 (0.1) 350 (1.2)    Emesis/NG output 0 (0)      Drains 1500 (0.6) 150 (0.1)     Other 0 (0)      Stool 300 (0.1) 0 (0)     Total Output 3440 350 350    Net -2900 -335.8 -350           Urine Occurrence 3 x 2 x     Stool Occurrence 3 x 2 x     Emesis Occurrence 0 x            Physical Exam   Constitutional: He is oriented to person, place, and time. He appears well-developed and well-nourished.   HENT:   Head:  Normocephalic and atraumatic.   Cardiovascular: Normal rate and regular rhythm.    Pulmonary/Chest: Effort normal and breath sounds normal.   Abdominal: Soft. He exhibits distension. Bowel sounds are decreased. There is no tenderness. There is no guarding.   Neurological: He is alert and oriented to person, place, and time.       Significant Labs:  CBC:   Recent Labs  Lab 12/24/17 0454   WBC 7.13  7.13   RBC 3.64*  3.64*   HGB 11.5*  11.5*   HCT 34.0*  34.0*     156   MCV 93  93   MCH 31.6*  31.6*   MCHC 33.8  33.8     CMP:   Recent Labs  Lab 12/19/17 1958 12/24/17 0454   *  < > 110  109   CALCIUM 9.2  < > 8.6*  8.5*   ALBUMIN 3.4*  < > 2.7*   PROT 7.0  --   --      < > 142  141   K 3.6  < > 3.6  3.6   CO2 26  < > 24  24     < > 109  109   BUN 14  < > 17  16   CREATININE 0.9  < > 0.8  0.8   ALKPHOS 74  --   --    ALT 19  --   --    AST 18  --   --    BILITOT 0.9  --   --    < > = values in this interval not displayed.    Significant Diagnostics:  I have reviewed all pertinent imaging results/findings within the past 24 hours.     KUB 12/24/107:  Impression:  Contrast present diffusely throughout colon, mitigating against complete small bowel obstruction...

## 2017-12-24 NOTE — PLAN OF CARE
Problem: Patient Care Overview  Goal: Plan of Care Review  Outcome: Ongoing (interventions implemented as appropriate)  Plan of care discussed with patient. Spoke with Dr. Johns and will re-assess pt this afternoon regarding taking out NGT and starting on diet. Remains on Heparin gtt per nomagram. Pt has ambulated in hallway. BM x 1 noted. Patient is free of fall/trauma/injury. Denies CP, SOB, or pain/discomfort. Wife remains at bedside and updated on current status. All questions addressed. Will continue to monitor

## 2017-12-24 NOTE — PROGRESS NOTES
Unclear about contrast orders. Notified Radiology. Radiologist Ambreen instructed to administer contrast and to leave NG tube clamped until the patient goes for the CT. Order will be carried out. Will continue to monitor.

## 2017-12-24 NOTE — PLAN OF CARE
Problem: Patient Care Overview  Goal: Plan of Care Review  Pt remained free of injuries, falls, and trauma. VSS. No complaints of pain. Pt has NGT in place. Contrast administered. NGT clamped off for Abd xray in the AM. Continuous heparin infusing at prescribed rate. Held x 1 hour and decreased by 3 units. Anti-xa being monitored.  Continuous dextrose 5% and 0.9 NaCl infusing at prescribed rate as well.  All questions answered. Plan of care reviewed. Pt verbalized understanding. Will continue to monitor.

## 2017-12-24 NOTE — ASSESSMENT & PLAN NOTE
- Patient starting to have bowel function and also with no significant NGT output; KUB this morning revealing contrast in colon. OK to clamp NGT and leave clamped. Please do not remove NGT. He will be re-examined later today and we will possibly remove the NGT. Clinically, he is moving in the right direction but we would like his abdominal exam to improve more prior to removal of the NGT.  - Continue to keep NPO  - MIVF  - Care per primary team  - Patient seen and examined with staff. We will continue to follow.

## 2017-12-24 NOTE — PROGRESS NOTES
"[facility]  General Surgery  Progress Note    Subjective:     History of Present Illness:  Milo Hart is a 82 y.o. male with h/o HTN and HLD, who developed acute onset shortness of breath and chest discomfort prior to plane ride from [place] to [place]; after landing, his symptoms acutely worsened.  He presented to an OSH where he was diagnosed with bilateral segmental PEs.  He was transferred to [facility] for management.  He was started on heparin gtt and underwent a catheter directed thrombolysis.  He had progressed well over the past couple of days and was nearing discharge on Eliquis when he developed nausea and vomiting today.  He and his wife state that he is "a little robust" around the waist at baseline, but they both noticed some abdominal distention yesterday afternoon.  He was passing flatus and having BMs and was tolerating diet well so they thought nothing of it.  Today, he had little appetite for breakfast or lunch.  He went on a walk this afternoon and developed nausea and bilious emesis.  An abdominal x-ray was obtained showing an obstructive bowel gas pattern. He reports decreased flatus today and he only had a very small BM.  He denies fever, chills, abdominal pain, abdominal or groin bulge, dysuria, hematuria, decreased urine volume, weight loss, chest pain, shortness of breath, blood in stool.  Last colonoscopy was 4 years ago and reportedly was "normal"; he was told to have another in 5 years.  No family history of colon cancer.  His abdominal surgical history consists of a lap alhaji in 2000, which was reportedly uncomplicated.  We have no medical records here as he is from [place].    Post-Op Info:  Procedure(s) (LRB):  Pta - Pulmonary Artery (Right)         Interval History: No significant issues overnight. Has had 2 BMs today. No nausea or vomiting. Not passing very much flatus. No significant NGT output. Denies any abdominal pain.    Medications:  Continuous " Infusions:   dextrose 5 % and 0.9 % NaCl 75 mL/hr at 12/23/17 2223    heparin (porcine) in D5W 8 Units/kg/hr (12/24/17 0841)     Scheduled Meds:   irbesartan  75 mg Oral Daily    oxybutynin  10 mg Oral Daily    simvastatin  40 mg Oral QHS    sodium chloride 0.9%  3 mL Intravenous Q8H    tamsulosin  0.4 mg Oral Daily     PRN Meds:acetaminophen, magnesium oxide, magnesium oxide, potassium chloride 10%, potassium chloride 10%, potassium chloride 10%, potassium, sodium phosphates, potassium, sodium phosphates, potassium, sodium phosphates, simethicone, traZODone     Review of patient's allergies indicates:  No Known Allergies  Objective:     Vital Signs (Most Recent):  Temp: 98.2 °F (36.8 °C) (12/24/17 0741)  Pulse: 74 (12/24/17 0741)  Resp: 18 (12/24/17 0741)  BP: 139/64 (12/24/17 0741)  SpO2: (!) 94 % (12/24/17 0741) Vital Signs (24h Range):  Temp:  [97.4 °F (36.3 °C)-98.2 °F (36.8 °C)] 98.2 °F (36.8 °C)  Pulse:  [57-77] 74  Resp:  [18-20] 18  SpO2:  [92 %-96 %] 94 %  BP: (131-141)/(62-67) 139/64     Weight: 94.1 kg (207 lb 7.3 oz)  Body mass index is 29.77 kg/m².    Intake/Output - Last 3 Shifts       12/22 0700 - 12/23 0659 12/23 0700 - 12/24 0659 12/24 0700 - 12/25 0659    P.O. 540 0     I.V. (mL/kg)  14.2 (0.1)     Total Intake(mL/kg) 540 (5.3) 14.2 (0.1)     Urine (mL/kg/hr) 1640 (0.7) 200 (0.1) 350 (1.2)    Emesis/NG output 0 (0)      Drains 1500 (0.6) 150 (0.1)     Other 0 (0)      Stool 300 (0.1) 0 (0)     Total Output 3440 350 350    Net -2900 -335.8 -350           Urine Occurrence 3 x 2 x     Stool Occurrence 3 x 2 x     Emesis Occurrence 0 x            Physical Exam   Constitutional: He is oriented to person, place, and time. He appears well-developed and well-nourished.   HENT:   Head: Normocephalic and atraumatic.   Cardiovascular: Normal rate and regular rhythm.    Pulmonary/Chest: Effort normal and breath sounds normal.   Abdominal: Soft. He exhibits distension. Bowel sounds are decreased. There  is no tenderness. There is no guarding.   Neurological: He is alert and oriented to person, place, and time.       Significant Labs:  CBC:   Recent Labs  Lab 12/24/17  0454   WBC 7.13  7.13   RBC 3.64*  3.64*   HGB 11.5*  11.5*   HCT 34.0*  34.0*     156   MCV 93  93   MCH 31.6*  31.6*   MCHC 33.8  33.8     CMP:   Recent Labs  Lab 12/19/17 1958 12/24/17  0454   *  < > 110  109   CALCIUM 9.2  < > 8.6*  8.5*   ALBUMIN 3.4*  < > 2.7*   PROT 7.0  --   --      < > 142  141   K 3.6  < > 3.6  3.6   CO2 26  < > 24  24     < > 109  109   BUN 14  < > 17  16   CREATININE 0.9  < > 0.8  0.8   ALKPHOS 74  --   --    ALT 19  --   --    AST 18  --   --    BILITOT 0.9  --   --    < > = values in this interval not displayed.    Significant Diagnostics:  I have reviewed all pertinent imaging results/findings within the past 24 hours.     KUB 12/24/107:  Impression:  Contrast present diffusely throughout colon, mitigating against complete small bowel obstruction...    Assessment/Plan:     * Small bowel obstruction    - Patient starting to have bowel function and also with no significant NGT output; KUB this morning revealing contrast in colon. OK to clamp NGT and leave clamped. Please do not remove NGT. He will be re-examined later today and we will possibly remove the NGT. Clinically, he is moving in the right direction but we would like his abdominal exam to improve more prior to removal of the NGT.  - Continue to keep NPO  - MIVF  - Care per primary team  - Patient seen and examined with staff. We will continue to follow.            Olive Torres MD  General Surgery  [facility]

## 2017-12-24 NOTE — PROGRESS NOTES
Dr. Torres at bedside and d/liz pt's left ngt per family. Diet changed to clear liguids for this evening. Family updated

## 2017-12-24 NOTE — PROGRESS NOTES
Patient re-assessed this afternoon. Had one large BM around noon and another smaller one prior to my evaluation. Denies nausea with NGT clamped for several hours. Passing flatus. Abdomen is not distended and back to baseline.     Removed NGT. Ok to start clears. Please do not advance diet tonight. Discussed with staff.    Olive Torres MD  General Surgery  PGY-[phone]  [phone] (pager)

## 2017-12-25 VITALS
WEIGHT: 205.94 LBS | DIASTOLIC BLOOD PRESSURE: 60 MMHG | RESPIRATION RATE: 18 BRPM | BODY MASS INDEX: 29.48 KG/M2 | HEART RATE: 71 BPM | HEIGHT: 70 IN | SYSTOLIC BLOOD PRESSURE: 129 MMHG | TEMPERATURE: 98 F | OXYGEN SATURATION: 95 %

## 2017-12-25 PROBLEM — E78.2 MIXED HYPERLIPIDEMIA: Status: ACTIVE | Noted: 2017-12-19

## 2017-12-25 PROBLEM — D64.9 ANEMIA: Status: ACTIVE | Noted: 2017-12-25

## 2017-12-25 PROBLEM — G47.33 OBSTRUCTIVE SLEEP APNEA: Status: ACTIVE | Noted: 2017-12-25

## 2017-12-25 PROBLEM — M51.37 DDD (DEGENERATIVE DISC DISEASE), LUMBOSACRAL: Status: ACTIVE | Noted: 2017-12-25

## 2017-12-25 PROBLEM — K57.30 DIVERTICULOSIS OF COLON: Status: ACTIVE | Noted: 2017-12-25

## 2017-12-25 PROBLEM — I26.02 ACUTE SADDLE PULMONARY EMBOLISM WITH ACUTE COR PULMONALE: Status: ACTIVE | Noted: 2017-12-25

## 2017-12-25 PROBLEM — R97.20 ELEVATED PROSTATE SPECIFIC ANTIGEN (PSA): Status: ACTIVE | Noted: 2017-12-25

## 2017-12-25 PROBLEM — M16.12 LOCALIZED OSTEOARTHROSIS OF LEFT HIP: Status: ACTIVE | Noted: 2017-12-25

## 2017-12-25 LAB
ALBUMIN SERPL BCP-MCNC: 2.7 G/DL
ANION GAP SERPL CALC-SCNC: 7 MMOL/L
ANION GAP SERPL CALC-SCNC: 7 MMOL/L
BASOPHILS # BLD AUTO: 0.05 K/UL
BASOPHILS NFR BLD: 0.7 %
BUN SERPL-MCNC: 12 MG/DL
BUN SERPL-MCNC: 12 MG/DL
CALCIUM SERPL-MCNC: 8.6 MG/DL
CALCIUM SERPL-MCNC: 8.6 MG/DL
CHLORIDE SERPL-SCNC: 110 MMOL/L
CHLORIDE SERPL-SCNC: 110 MMOL/L
CO2 SERPL-SCNC: 22 MMOL/L
CO2 SERPL-SCNC: 22 MMOL/L
CREAT SERPL-MCNC: 0.7 MG/DL
CREAT SERPL-MCNC: 0.8 MG/DL
DIFFERENTIAL METHOD: ABNORMAL
EOSINOPHIL # BLD AUTO: 0.3 K/UL
EOSINOPHIL NFR BLD: 4.3 %
ERYTHROCYTE [DISTWIDTH] IN BLOOD BY AUTOMATED COUNT: 12.7 %
EST. GFR  (AFRICAN AMERICAN): >60 ML/MIN/1.73 M^2
EST. GFR  (AFRICAN AMERICAN): >60 ML/MIN/1.73 M^2
EST. GFR  (NON AFRICAN AMERICAN): >60 ML/MIN/1.73 M^2
EST. GFR  (NON AFRICAN AMERICAN): >60 ML/MIN/1.73 M^2
FACT X PPP CHRO-ACNC: 0.4 IU/ML
FACT X PPP CHRO-ACNC: 0.5 IU/ML
GLUCOSE SERPL-MCNC: 94 MG/DL
GLUCOSE SERPL-MCNC: 95 MG/DL
HCT VFR BLD AUTO: 32.2 %
HGB BLD-MCNC: 11 G/DL
IMM GRANULOCYTES # BLD AUTO: 0.02 K/UL
IMM GRANULOCYTES NFR BLD AUTO: 0.3 %
INR PPP: 1
LYMPHOCYTES # BLD AUTO: 0.9 K/UL
LYMPHOCYTES NFR BLD: 12.6 %
MCH RBC QN AUTO: 32 PG
MCHC RBC AUTO-ENTMCNC: 34.2 G/DL
MCV RBC AUTO: 94 FL
MONOCYTES # BLD AUTO: 0.8 K/UL
MONOCYTES NFR BLD: 10.9 %
NEUTROPHILS # BLD AUTO: 5 K/UL
NEUTROPHILS NFR BLD: 71.2 %
NRBC BLD-RTO: 0 /100 WBC
PHOSPHATE SERPL-MCNC: 2.5 MG/DL
PLATELET # BLD AUTO: 161 K/UL
PMV BLD AUTO: 10.8 FL
POTASSIUM SERPL-SCNC: 3.6 MMOL/L
POTASSIUM SERPL-SCNC: 3.6 MMOL/L
PROTHROMBIN TIME: 10.7 SEC
RBC # BLD AUTO: 3.44 M/UL
SODIUM SERPL-SCNC: 139 MMOL/L
SODIUM SERPL-SCNC: 139 MMOL/L
WBC # BLD AUTO: 6.97 K/UL

## 2017-12-25 PROCEDURE — 99231 SBSQ HOSP IP/OBS SF/LOW 25: CPT | Mod: GC,,, | Performed by: SURGERY

## 2017-12-25 PROCEDURE — 36415 COLL VENOUS BLD VENIPUNCTURE: CPT

## 2017-12-25 PROCEDURE — 80069 RENAL FUNCTION PANEL: CPT

## 2017-12-25 PROCEDURE — 25000003 PHARM REV CODE 250: Performed by: HOSPITALIST

## 2017-12-25 PROCEDURE — 85610 PROTHROMBIN TIME: CPT

## 2017-12-25 PROCEDURE — 85520 HEPARIN ASSAY: CPT

## 2017-12-25 PROCEDURE — 25000003 PHARM REV CODE 250: Performed by: STUDENT IN AN ORGANIZED HEALTH CARE EDUCATION/TRAINING PROGRAM

## 2017-12-25 PROCEDURE — 80048 BASIC METABOLIC PNL TOTAL CA: CPT

## 2017-12-25 PROCEDURE — 85025 COMPLETE CBC W/AUTO DIFF WBC: CPT

## 2017-12-25 PROCEDURE — 99239 HOSP IP/OBS DSCHRG MGMT >30: CPT | Mod: ,,, | Performed by: HOSPITALIST

## 2017-12-25 RX ADMIN — APIXABAN 10 MG: 5 TABLET, FILM COATED ORAL at 02:12

## 2017-12-25 RX ADMIN — IRBESARTAN 75 MG: 75 TABLET ORAL at 08:12

## 2017-12-25 RX ADMIN — OXYBUTYNIN CHLORIDE 10 MG: 10 TABLET, FILM COATED, EXTENDED RELEASE ORAL at 08:12

## 2017-12-25 RX ADMIN — TAMSULOSIN HYDROCHLORIDE 0.4 MG: 0.4 CAPSULE ORAL at 08:12

## 2017-12-25 RX ADMIN — AMLODIPINE BESYLATE 5 MG: 5 TABLET ORAL at 08:12

## 2017-12-25 NOTE — ASSESSMENT & PLAN NOTE
- Ok to advance to cardiac diet; if he tolerates that with no issues, he is safe from a surgical standpoint to be discharged home  - Medical management per primary team  - Please call with questions

## 2017-12-25 NOTE — PLAN OF CARE
BP 160s systolic through early evening- still elevated now. Will give norvac 5 mg PO x1 for better control.

## 2017-12-25 NOTE — PROGRESS NOTES
Attending attestation.    Patient is doing well with NGT out and on clears.  Would advance to regular diet this am. If tolerated ok to dc home from surgery standpoint.    Thanks.    Moni Cordon MD  Endocrine Surgery

## 2017-12-25 NOTE — PROGRESS NOTES
12/24/17 2019   Vital Signs   BP (!) 169/70   MAP (mmHg) 101   BP Location Left arm   BP Method Automatic   Patient Position Lying     Notified Dr. Fernández of pt's elevated BP. Stated would look over chart and place orders accordingly. Will follow up. Will continue to monitor.

## 2017-12-25 NOTE — PROGRESS NOTES
"[facility]  General Surgery  Progress Note    Subjective:     History of Present Illness:  Milo Hart is a 82 y.o. male with h/o HTN and HLD, who developed acute onset shortness of breath and chest discomfort prior to plane ride from [place] to [place]; after landing, his symptoms acutely worsened.  He presented to an OSH where he was diagnosed with bilateral segmental PEs.  He was transferred to [facility] for management.  He was started on heparin gtt and underwent a catheter directed thrombolysis.  He had progressed well over the past couple of days and was nearing discharge on Eliquis when he developed nausea and vomiting today.  He and his wife state that he is "a little robust" around the waist at baseline, but they both noticed some abdominal distention yesterday afternoon.  He was passing flatus and having BMs and was tolerating diet well so they thought nothing of it.  Today, he had little appetite for breakfast or lunch.  He went on a walk this afternoon and developed nausea and bilious emesis.  An abdominal x-ray was obtained showing an obstructive bowel gas pattern. He reports decreased flatus today and he only had a very small BM.  He denies fever, chills, abdominal pain, abdominal or groin bulge, dysuria, hematuria, decreased urine volume, weight loss, chest pain, shortness of breath, blood in stool.  Last colonoscopy was 4 years ago and reportedly was "normal"; he was told to have another in 5 years.  No family history of colon cancer.  His abdominal surgical history consists of a lap alhaji in 2000, which was reportedly uncomplicated.  We have no medical records here as he is from [place].    Post-Op Info:  Procedure(s) (LRB):  Pta - Pulmonary Artery (Right)         Interval History: No acute events overnight. Tolerating clears well, no nausea or vomiting. Had 3 more BMs yesterday. Desires to eat more today.    Medications:  Continuous Infusions:   heparin (porcine) in D5W 7 " Units/kg/hr (12/24/17 1920)     Scheduled Meds:   amLODIPine  5 mg Oral Daily    irbesartan  75 mg Oral Daily    oxybutynin  10 mg Oral Daily    simvastatin  40 mg Oral QHS    sodium chloride 0.9%  3 mL Intravenous Q8H    tamsulosin  0.4 mg Oral Daily     PRN Meds:acetaminophen, magnesium oxide, magnesium oxide, potassium chloride 10%, potassium chloride 10%, potassium chloride 10%, potassium, sodium phosphates, potassium, sodium phosphates, potassium, sodium phosphates, simethicone, traZODone     Review of patient's allergies indicates:  No Known Allergies  Objective:     Vital Signs (Most Recent):  Temp: 97.8 °F (36.6 °C) (12/25/17 0718)  Pulse: 73 (12/25/17 0718)  Resp: 16 (12/25/17 0718)  BP: (!) 164/73 (12/25/17 0718)  SpO2: 98 % (12/25/17 0718) Vital Signs (24h Range):  Temp:  [97.4 °F (36.3 °C)-98 °F (36.7 °C)] 97.8 °F (36.6 °C)  Pulse:  [57-82] 73  Resp:  [16-19] 16  SpO2:  [91 %-98 %] 98 %  BP: (134-169)/(70-74) 164/73     Weight: 94.1 kg (207 lb 7.3 oz)  Body mass index is 29.77 kg/m².    Intake/Output - Last 3 Shifts       12/23 0700 - 12/24 0659 12/24 0700 - 12/25 0659 12/25 0700 - 12/26 0659    P.O. 0 1230     I.V. (mL/kg) 14.2 (0.1) 599.4 (6.4)     Total Intake(mL/kg) 14.2 (0.1) 1829.4 (19.4)     Urine (mL/kg/hr) 200 (0.1) 900 (0.4)     Emesis/NG output       Drains 150 (0.1)      Other       Stool 0 (0) 0 (0)     Total Output 350 900      Net -335.8 +929.4             Urine Occurrence 2 x 3 x     Stool Occurrence 2 x 3 x           Physical Exam   Constitutional: He is oriented to person, place, and time. He appears well-developed and well-nourished.   HENT:   Head: Normocephalic and atraumatic.   Cardiovascular: Normal rate and regular rhythm.    Pulmonary/Chest: Effort normal and breath sounds normal.   Abdominal: Soft. Bowel sounds are normal. He exhibits no distension. There is no tenderness.   Neurological: He is alert and oriented to person, place, and time.   Skin: Skin is warm and dry.        Significant Labs:  CBC:     Recent Labs  Lab 12/25/17  0505   WBC 6.97   RBC 3.44*   HGB 11.0*   HCT 32.2*      MCV 94   MCH 32.0*   MCHC 34.2     CMP:   Recent Labs  Lab 12/19/17 1958 12/25/17  0505   *  < > 94  95   CALCIUM 9.2  < > 8.6*  8.6*   ALBUMIN 3.4*  < > 2.7*   PROT 7.0  --   --      < > 139  139   K 3.6  < > 3.6  3.6   CO2 26  < > 22*  22*     < > 110  110   BUN 14  < > 12  12   CREATININE 0.9  < > 0.8  0.7   ALKPHOS 74  --   --    ALT 19  --   --    AST 18  --   --    BILITOT 0.9  --   --    < > = values in this interval not displayed.    Significant Diagnostics:  I have reviewed all pertinent imaging results/findings within the past 24 hours.       Assessment/Plan:     * Small bowel obstruction    - Ok to advance to cardiac diet; if he tolerates that with no issues, he is safe from a surgical standpoint to be discharged home  - Medical management per primary team  - Please call with questions            Olive Torres MD  General Surgery  [facility]

## 2017-12-25 NOTE — PLAN OF CARE
Problem: Patient Care Overview  Goal: Plan of Care Review  Outcome: Ongoing (interventions implemented as appropriate)  Pt free of falls, injury this shift. POC reviewed with pt at bedside, verbalized understanding. NGT removed per Gen Surgery reccs, now on clear liquid diet, tolerating well. Heparin gtt infusing per MD order, titrating based off antiXa and following nomogram. BP elevated, with SBP in 160s, at beginning of shift; norvasc PO given; see clinical note. PT denies any pain, SOB at this time. Will continue to monitor.

## 2017-12-25 NOTE — PLAN OF CARE
Problem: Patient Care Overview  Goal: Plan of Care Review  Outcome: Revised  Plan of care discussed with patient. Patient is free of fall/trauma/injury. Denies CP, SOB, or pain/discomfort. Heparin gtt infusing per MD order.  All questions addressed. Will continue to monitor

## 2017-12-28 NOTE — PT/OT/SLP DISCHARGE
Physical Therapy Discharge Summary    Name: Milo Hart  MRN: [id number]   Principal Problem: Acute saddle pulmonary embolism with acute cor pulmonale     Patient Discharged from acute Physical Therapy on 2017.  Please refer to prior PT noted date on 2017 for functional status.     Assessment:     Patient was discharged unexpectedly.  Information required to complete an accurate discharge summary is unknown.  Refer to therapy initial evaluation and last progress note for initial and most recent functional status and goal achievement.  Recommendations made may be found in medical record.    Objective:     GOALS:    Physical Therapy Goals        Problem: Physical Therapy Goal    Goal Priority Disciplines Outcome Goal Variances Interventions   Physical Therapy Goal     PT/OT, PT Ongoing (interventions implemented as appropriate)     Description:  Goals to be met by: 2017    Patient will increase functional independence with mobility by performin. Supine to sit with Hartwick - not met  2. Sit to stand transfer with Hartwick - not met  3. Gait  x 300 feet with Hartwick - not met                      Reasons for Discontinuation of Therapy Services  Transfer to alternate level of care.      Plan:     Patient Discharged to: Home no PT services needed.    Mónica Teran, PT, DPT  2017  [phone]

## 2018-01-07 NOTE — DISCHARGE SUMMARY
"[facility] Hospital Medicine  Discharge Summary      Patient Name: Milo Hart  MRN: [id number]  Admission Date: 12/19/2017  Hospital Length of Stay: 6 days  Discharge Date and Time: 12/25/2017  3:44 PM  Attending Physician: Jackie Johns MD  Discharging Provider: Jackie Johns MD  Primary Care Provider: Primary Doctor [facility] Medicine Team: [facility]  Jackie Johns MD    HPI: Mr. Hart is an 82 y.o. man from [place], [place] with HTN, HLD, h/o elevated PSA, but otherwise healthy who traveled here to [place] for [place] and developed SOB and chest pain on 12/18 shortly before he [place] [facility] [place] then drove 100 miles to [place], [place] and the pain/SOB worsened so he went to the local ED at [facility] ([place], [place]) and was found to have massive PE and B DVTs so given Lovenox SQ then transferred here to our ED and admitted to CCU for catheter-directed tPA    Per CCU HPI, "No shortness of breath at rest - mostly with exertion, no palpitations, no syncope. Denies any recent surgeries, hx of cancer, family hx of blood clots, prolonged immobilization. W/u at OSH notable for bilateral segmental PE just at the take off from right and left main PA.  Troponin elevated at OSH, also noted to have DVT.  Currently HR 70, /78, satting 93% on 3L NC breathing comfortably speaking complete sentences.  Bedside TTE in the ED with mild-moderately depressed RV, moderately enlarged RV, septal flattening in diastole with collapsing IVC."     Hospital Course: see CCU notes. Stepped [place] to [facility].  General surgery consulted for Ileus.  CT unremarkable, passing flatus throughout, no SMA thrombus. The next day, abd distention much improved, NGT output minimal.  Clamped NGT for 1 hour and gave morning meds. Discussed with surgery resident Dr Pa - plan to clamp NGT at 9pm, give contrast via NGT, then timed KUB at 5am the next day (8 hrs after contrast " "given) - if no improvement or worsening, may need EGD. Overnight, he had BM.  KUB with good result as follows: "Contrast present diffusely throughout colon, mitigating against complete small bowel obstruction.." I spoke with surgery resident Dr Torres, plan to eval in am and afteroon. In afternoon, doing well. NGT D/C-ED, trial of clears overnight. He did great, started solid diet on the morning of d/c home per surgery.  He tolerated diet, so ok for d/c home.    A/P:  Saddle PE, submassive, with cor pulmonale s/p catheter directed tPA - went to cath lab with Dr Tee Lomeli for placement of EKOS catheters in both R and L PAs.  doing great now  - continue hep gtt for now pending any potential procedures by surgery or GI (unlikely) then resume apixaban BID  - outpt hypercoagulable workup ASAP with outside PCP or Hematologist as I will not be able to follow him in clinic and most labs would be pending at time of d/c home  - up to date on age-appropriate malignancy screening (negative c-scope 2013), but had chronic PSA elevation that was no longer followed due to his age, so recommend to re-visit with his PCP and Urologist back in [place]    Ileus, idiopathic. Resolved     Anemia - likely acute on chronic illness - no active bleed - stable, monitor     Hypoalbuminemia - unclear etiology as he appears well-nourished.  Concern for occult malignancy given the unprovoked VTE. Defer to PCP.      HypoK - replaced PO  Hypophos - replaced PO     Prophylaxis- already on hep gtt     Advance directives - full code     Post-acute care- to son's home in [place], [place] today without services.  F/u closely back home in [place], [place] with PCP.  Needs hypercoagulable workup with PCP or hematologist - I did not initate this workup since we will not be able to follow him here.  I recommend follow up with his Urologist as well, as he has reported h/o elevated PSA, previously under surveillance then stopped due to his age.  May need endoscopic " evaluation with EGD to eval the ileus of unclear etiology (eg gastric CA, other), could also consider EUS at that time to eval for occult early pancreatic malignancy.  Alternatively, could consider CT pancreatic protocol vs MRCP.  Of note, TBili/AST/ALT wnl. I printed his 2D Echo reports, cath report, and CT Abd report for him in order to expedite his workup back home.      PE on day of d/c:  Vital Signs Range (Last 24H):  Temp:  [97.4 °F (36.3 °C)-98 °F (36.7 °C)]   Pulse:  [57-82]   Resp:  [16-19]   BP: (134-169)/(70-74)   SpO2:  [91 %-98 %]      I & O (Last 24H):     Intake/Output Summary (Last 24 hours) at 12/25/17 0853  Last data filed at 12/25/17 0600    Gross per 24 hour   Intake           1829.4 ml   Output              550 ml   Net           1279.4 ml         Physical Exam:  General appearance: no distress  Mental status: Alert and oriented x 3  HEENT:  conjunctivae/corneas clear, PERRL  Neck: supple, thyroid not enlarged  Pulm:   normal respiratory effort, CTA B, no c/w/r  Card: RRR, S1, S2 normal, no murmur, click, rub or gallop  Abd: soft, NT, ND, BS present; no masses, no organomegaly  Ext: no c/c/e  Pulses: 2+, symmetric  Skin: color, texture, turgor normal. No rashes or lesions  Neuro: CN II-XII grossly intact, no focal numbness or weakness, normal strength and tone    Procedure(s) (LRB):  Pta - Pulmonary Artery (Right)     Consults:   Consults         Status Ordering Provider     Inpatient consult to General Surgery  Once     Provider:  (Not yet assigned)    Completed JONAS HUTCHISON NJeremiahAJeremiah     Inpatient consult to Interventional Cardiology  Once     Provider:  (Not yet assigned)    TATY Martin          Final Active Diagnoses:    Diagnosis Date Noted POA    PRINCIPAL PROBLEM:  Acute saddle pulmonary embolism with acute cor pulmonale [I26.02] 12/25/2017 Yes    Ileus [K56.7] 12/23/2017 Yes    Small bowel obstruction [K56.609] 12/22/2017 Yes    Submassive Pulmonary embolism [I26.99]  12/19/2017 Yes    Hypertension [I10] 12/19/2017 Yes    Mixed hyperlipidemia [E78.2] 12/19/2017 Yes    Benign prostatic hypertrophy without urinary obstruction [N40.0] 12/19/2017 Yes      Problems Resolved During this Admission:    Diagnosis Date Noted Date Resolved POA    Shortness of breath [R06.02] 12/19/2017 12/22/2017 Yes      Discharged Condition: stable    Disposition: Home or Self Care    Follow Up:  Follow-up Information     Primary Care Doctor.           Hematology doctor.    Why:  if your primary care doctor deems necessary           Hypercoagulable lab panel.           Urologist.    Why:  to re-discuss your prostate and evaluate for cancer, given your new diagnosis of pulmonary embolism and deep venous thrombosis of the leg                Patient Instructions:     Diet Adult Regular   Order Specific Question Answer Comments   Additional restrictions: Cardiac (Low Na/Chol)      Activity as tolerated     Notify your health care provider if you experience any of the following:  temperature >100.4     Notify your health care provider if you experience any of the following:  persistent nausea and vomiting or diarrhea     Notify your health care provider if you experience any of the following:  severe uncontrolled pain     Notify your health care provider if you experience any of the following:  difficulty breathing or increased cough     Notify your health care provider if you experience any of the following:  redness, tenderness, or signs of infection (pain, swelling, redness, odor or green/yellow discharge around incision site)     Notify your health care provider if you experience any of the following:  severe persistent headache     Notify your health care provider if you experience any of the following:  persistent dizziness, light-headedness, or visual disturbances     Notify your health care provider if you experience any of the following:  increased confusion or weakness     Activity as tolerated      Notify your health care provider if you experience any of the following:  severe uncontrolled pain     Notify your health care provider if you experience any of the following:  persistent nausea and vomiting or diarrhea     Notify your health care provider if you experience any of the following:  temperature >100.4     Notify your health care provider if you experience any of the following:  persistent dizziness, light-headedness, or visual disturbances     Notify your health care provider if you experience any of the following:  increased confusion or weakness     Notify your health care provider if you experience any of the following:  difficulty breathing or increased cough     Notify your health care provider if you experience any of the following:  worsening rash       Medications:  Reconciled Home Medications:   Discharge Medication List as of 12/25/2017  2:29 PM      START taking these medications    Details   !! apixaban 5 mg Tab 10 mg daily for 6 days, then 5mg bid thereafter, Normal      !! apixaban 5 mg Tab Take 2 tablets (10 mg total) by mouth 2 (two) times daily., Starting Fri 12/22/2017, Until Thu 12/28/2017, Print      !! apixaban 5 mg Tab Take 1 tablet (5 mg total) by mouth 2 (two) times daily., Starting Thu 12/28/2017, Until Wed 3/21/2018, Print       !! - Potential duplicate medications found. Please discuss with provider.      CONTINUE these medications which have NOT CHANGED    Details   hydroCHLOROthiazide (HYDRODIURIL) 12.5 MG Tab Take 12.5 mg by mouth once daily., Historical Med      irbesartan (AVAPRO) 75 MG tablet Take 75 mg by mouth every evening., Historical Med      oxybutynin (DITROPAN-XL) 10 MG 24 hr tablet Take 10 mg by mouth once daily., Historical Med      simvastatin (ZOCOR) 40 MG tablet Take 40 mg by mouth every evening., Historical Med      tamsulosin (FLOMAX) 0.4 mg Cp24 Take 0.4 mg by mouth once daily., Historical Med             Significant Diagnostic Studies: see  above    Pending Diagnostic Studies:     None        Indwelling Lines/Drains at time of discharge:   Lines/Drains/Airways          No matching active lines, drains, or airways          Time spent on the discharge of patient: 40 minutes  Patient was seen and examined on the date of discharge and determined to be suitable for discharge.    Jackie Johns MD  Department of Hospital Medicine  [facility]

## 2018-03-14 NOTE — SUBJECTIVE & OBJECTIVE
Interval History: No acute events overnight. Tolerating clears well, no nausea or vomiting. Had 3 more BMs yesterday. Desires to eat more today.    Medications:  Continuous Infusions:   heparin (porcine) in D5W 7 Units/kg/hr (12/24/17 1920)     Scheduled Meds:   amLODIPine  5 mg Oral Daily    irbesartan  75 mg Oral Daily    oxybutynin  10 mg Oral Daily    simvastatin  40 mg Oral QHS    sodium chloride 0.9%  3 mL Intravenous Q8H    tamsulosin  0.4 mg Oral Daily     PRN Meds:acetaminophen, magnesium oxide, magnesium oxide, potassium chloride 10%, potassium chloride 10%, potassium chloride 10%, potassium, sodium phosphates, potassium, sodium phosphates, potassium, sodium phosphates, simethicone, traZODone     Review of patient's allergies indicates:  No Known Allergies  Objective:     Vital Signs (Most Recent):  Temp: 97.8 °F (36.6 °C) (12/25/17 0718)  Pulse: 73 (12/25/17 0718)  Resp: 16 (12/25/17 0718)  BP: (!) 164/73 (12/25/17 0718)  SpO2: 98 % (12/25/17 0718) Vital Signs (24h Range):  Temp:  [97.4 °F (36.3 °C)-98 °F (36.7 °C)] 97.8 °F (36.6 °C)  Pulse:  [57-82] 73  Resp:  [16-19] 16  SpO2:  [91 %-98 %] 98 %  BP: (134-169)/(70-74) 164/73     Weight: 94.1 kg (207 lb 7.3 oz)  Body mass index is 29.77 kg/m².    Intake/Output - Last 3 Shifts       12/23 0700 - 12/24 0659 12/24 0700 - 12/25 0659 12/25 0700 - 12/26 0659    P.O. 0 1230     I.V. (mL/kg) 14.2 (0.1) 599.4 (6.4)     Total Intake(mL/kg) 14.2 (0.1) 1829.4 (19.4)     Urine (mL/kg/hr) 200 (0.1) 900 (0.4)     Emesis/NG output       Drains 150 (0.1)      Other       Stool 0 (0) 0 (0)     Total Output 350 900      Net -335.8 +929.4             Urine Occurrence 2 x 3 x     Stool Occurrence 2 x 3 x           Physical Exam   Constitutional: He is oriented to person, place, and time. He appears well-developed and well-nourished.   HENT:   Head: Normocephalic and atraumatic.   Cardiovascular: Normal rate and regular rhythm.    Pulmonary/Chest: Effort normal and  breath sounds normal.   Abdominal: Soft. Bowel sounds are normal. He exhibits no distension. There is no tenderness.   Neurological: He is alert and oriented to person, place, and time.   Skin: Skin is warm and dry.       Significant Labs:  CBC:     Recent Labs  Lab 12/25/17  0505   WBC 6.97   RBC 3.44*   HGB 11.0*   HCT 32.2*      MCV 94   MCH 32.0*   MCHC 34.2     CMP:   Recent Labs  Lab 12/19/17 1958 12/25/17  0505   *  < > 94  95   CALCIUM 9.2  < > 8.6*  8.6*   ALBUMIN 3.4*  < > 2.7*   PROT 7.0  --   --      < > 139  139   K 3.6  < > 3.6  3.6   CO2 26  < > 22*  22*     < > 110  110   BUN 14  < > 12  12   CREATININE 0.9  < > 0.8  0.7   ALKPHOS 74  --   --    ALT 19  --   --    AST 18  --   --    BILITOT 0.9  --   --    < > = values in this interval not displayed.    Significant Diagnostics:  I have reviewed all pertinent imaging results/findings within the past 24 hours.      Statement Selected